# Patient Record
Sex: FEMALE | Race: WHITE | NOT HISPANIC OR LATINO | Employment: FULL TIME | URBAN - METROPOLITAN AREA
[De-identification: names, ages, dates, MRNs, and addresses within clinical notes are randomized per-mention and may not be internally consistent; named-entity substitution may affect disease eponyms.]

---

## 2017-01-04 ENCOUNTER — HOSPITAL ENCOUNTER (OUTPATIENT)
Dept: MAMMOGRAPHY | Facility: CLINIC | Age: 67
Discharge: HOME | End: 2017-01-04
Attending: INTERNAL MEDICINE | Admitting: INTERNAL MEDICINE
Payer: COMMERCIAL

## 2017-01-04 DIAGNOSIS — Z12.31 VISIT FOR SCREENING MAMMOGRAM: ICD-10-CM

## 2017-01-04 PROCEDURE — G0202 SCR MAMMO BI INCL CAD: HCPCS

## 2018-01-08 ENCOUNTER — HOSPITAL ENCOUNTER (OUTPATIENT)
Dept: MAMMOGRAPHY | Facility: CLINIC | Age: 68
Discharge: HOME | End: 2018-01-08
Attending: INTERNAL MEDICINE | Admitting: INTERNAL MEDICINE
Payer: COMMERCIAL

## 2018-01-08 DIAGNOSIS — Z12.31 VISIT FOR SCREENING MAMMOGRAM: ICD-10-CM

## 2018-01-08 PROCEDURE — 77067 SCR MAMMO BI INCL CAD: CPT

## 2019-01-18 ENCOUNTER — HOSPITAL ENCOUNTER (OUTPATIENT)
Dept: MAMMOGRAPHY | Facility: CLINIC | Age: 69
Discharge: HOME | End: 2019-01-18
Attending: INTERNAL MEDICINE | Admitting: INTERNAL MEDICINE
Payer: COMMERCIAL

## 2019-01-18 DIAGNOSIS — Z12.31 VISIT FOR SCREENING MAMMOGRAM: ICD-10-CM

## 2019-01-18 PROCEDURE — 77067 SCR MAMMO BI INCL CAD: CPT

## 2019-06-11 ENCOUNTER — APPOINTMENT (RX ONLY)
Dept: URBAN - NONMETROPOLITAN AREA CLINIC 31 | Facility: CLINIC | Age: 69
Setting detail: DERMATOLOGY
End: 2019-06-11

## 2020-01-20 ENCOUNTER — HOSPITAL ENCOUNTER (OUTPATIENT)
Dept: MAMMOGRAPHY | Facility: CLINIC | Age: 70
Discharge: HOME | End: 2020-01-20
Attending: INTERNAL MEDICINE | Admitting: INTERNAL MEDICINE
Payer: COMMERCIAL

## 2020-01-20 DIAGNOSIS — Z12.31 VISIT FOR SCREENING MAMMOGRAM: ICD-10-CM

## 2020-01-20 PROCEDURE — 77067 SCR MAMMO BI INCL CAD: CPT

## 2021-10-28 ENCOUNTER — OFFICE VISIT (OUTPATIENT)
Dept: OBGYN | Facility: CLINIC | Age: 71
End: 2021-10-28
Payer: COMMERCIAL

## 2021-10-28 ENCOUNTER — TELEPHONE (OUTPATIENT)
Dept: UROLOGY | Facility: CLINIC | Age: 71
End: 2021-10-28

## 2021-10-28 VITALS
DIASTOLIC BLOOD PRESSURE: 70 MMHG | WEIGHT: 193.3 LBS | BODY MASS INDEX: 35.57 KG/M2 | HEIGHT: 62 IN | SYSTOLIC BLOOD PRESSURE: 126 MMHG

## 2021-10-28 DIAGNOSIS — R31.0 GROSS HEMATURIA: Primary | ICD-10-CM

## 2021-10-28 DIAGNOSIS — N90.89 VULVAR LESION: ICD-10-CM

## 2021-10-28 DIAGNOSIS — Z12.4 ENCOUNTER FOR PAPANICOLAOU SMEAR FOR CERVICAL CANCER SCREENING: ICD-10-CM

## 2021-10-28 DIAGNOSIS — N95.2 VAGINAL ATROPHY: ICD-10-CM

## 2021-10-28 PROCEDURE — 87624 HPV HI-RISK TYP POOLED RSLT: CPT | Performed by: FAMILY MEDICINE

## 2021-10-28 PROCEDURE — 88305 TISSUE EXAM BY PATHOLOGIST: CPT | Performed by: PATHOLOGY

## 2021-10-28 PROCEDURE — 99203 OFFICE O/P NEW LOW 30 MIN: CPT | Performed by: FAMILY MEDICINE

## 2021-10-28 PROCEDURE — G0145 SCR C/V CYTO,THINLAYER,RESCR: HCPCS | Performed by: FAMILY MEDICINE

## 2021-10-28 PROCEDURE — 88312 SPECIAL STAINS GROUP 1: CPT | Performed by: PATHOLOGY

## 2021-10-28 RX ORDER — LOSARTAN POTASSIUM 50 MG/1
TABLET ORAL
COMMUNITY
Start: 2021-09-30

## 2021-10-28 RX ORDER — BETAMETHASONE DIPROPIONATE 0.5 MG/G
CREAM TOPICAL 2 TIMES DAILY
Qty: 70 G | Refills: 11 | Status: SHIPPED | OUTPATIENT
Start: 2021-10-28 | End: 2021-11-11

## 2021-10-28 RX ORDER — METOPROLOL SUCCINATE 50 MG/1
TABLET, EXTENDED RELEASE ORAL
COMMUNITY
Start: 2021-10-16

## 2021-10-28 RX ORDER — VITAMIN B COMPLEX/FOLIC ACID 0.4 MG
1 TABLET ORAL
COMMUNITY
Start: 2020-03-03

## 2021-10-28 RX ORDER — ESTRADIOL 10 UG/1
10 INSERT VAGINAL
Qty: 24 TABLET | Refills: 3 | Status: SHIPPED | OUTPATIENT
Start: 2021-10-28 | End: 2022-02-08

## 2021-10-28 RX ORDER — FAMOTIDINE 20 MG/1
TABLET, FILM COATED ORAL
COMMUNITY
Start: 2020-03-03 | End: 2024-04-24

## 2021-10-28 RX ORDER — ROPINIROLE 2 MG/1
TABLET, FILM COATED ORAL
COMMUNITY
Start: 2021-10-19 | End: 2024-04-24

## 2021-10-28 RX ORDER — ASPIRIN 81 MG/1
81 TABLET, CHEWABLE ORAL DAILY
COMMUNITY

## 2021-10-28 ASSESSMENT — MIFFLIN-ST. JEOR: SCORE: 1355.05

## 2021-10-28 NOTE — PATIENT INSTRUCTIONS
Return in 1-2 weeks for suture removal     Dr. Anu Tinajero,     Obstetrics and Gynecology  [facility] - [place] and [place]

## 2021-10-28 NOTE — PROGRESS NOTES
SUBJECTIVE:  Cammy Mahmood is an 71 year old No obstetric history on file.  woman who presents for   gynecology consult for vulvar irritation and bleeding from dryness, occurring with intercourse, tender, itching.  In the past used estrogen vaginally in her 40s, and then got better.   No LMP recorded. Patient is postmenopausal.   Menopause occurred mid 40s. No pap smears in our system.  Bladder filled with blood a week ago, saw urgent care,   Was treated as UTI.       History of abnormal Pap smear: Yes: but recently normal,   Now off pap screening.   Family history of uterine or ovarian cancer: No  History of abnormal mammogram: No  Family history of breast cancer: Yes:  age 50.        History reviewed. No pertinent past medical history.       History reviewed. No pertinent family history.    History reviewed. No pertinent surgical history.    Current Outpatient Medications   Medication     aspirin (ASA) 81 MG chewable tablet     doxylamine (UNISOM) 25 MG TABS tablet     famotidine (PEPCID) 20 MG tablet     losartan (COZAAR) 50 MG tablet     metoprolol succinate ER (TOPROL-XL) 50 MG 24 hr tablet     Multiple Vitamin (MULTIVITAMIN OR)     omeprazole (PRILOSEC) 20 MG DR capsule     rOPINIRole (REQUIP) 2 MG tablet     vitamin D3 (CHOLECALCIFEROL) 125 MCG (5000 UT) tablet     Vitamins-Lipotropics (B COMPLEX FORMULA 1, LIPOTROP,) TABS     No current facility-administered medications for this visit.     Allergies   Allergen Reactions     Other [No Clinical Screening - See Comments]      mirapex for restless legs       Social History     Tobacco Use     Smoking status: Never Smoker     Smokeless tobacco: Never Used   Substance Use Topics     Alcohol use: Not on file       Review Of Systems  Ears/Nose/Throat: negative  Respiratory: No shortness of breath, dyspnea on exertion, cough, or hemoptysis  Cardiovascular: negative  Gastrointestinal: negative  Genitourinary: See HPI   Constitutional, HEENT, cardiovascular,  "pulmonary, GI, , musculoskeletal, neuro, skin, endocrine and psych systems are negative, except as otherwise noted.    OBJECTIVE:  /70   Ht 1.575 m (5' 2\")   Wt 87.7 kg (193 lb 4.8 oz)   BMI 35.36 kg/m    General appearance: healthy, alert and no distress  Skin: Skin color, texture, turgor normal. No rashes or lesions.  Ears: negative  Nose/Sinuses: Nares normal. Septum midline. Mucosa normal. No drainage or sinus tenderness.  Oropharynx: Lips, mucosa, and tongue normal. Teeth and gums normal.  Neck: Neck supple. No adenopathy. Thyroid symmetric, normal size,, Carotids without bruits.  Lungs: negative, Percussion normal. Good diaphragmatic excursion. Lungs clear  Heart: negative, PMI normal. No lifts, heaves, or thrills. RRR. No murmurs, clicks gallops or rub  Breasts: deferred  Abdomen: Abdomen soft, non-tender. BS normal. No masses, organomegaly  Pelvic: Pelvic:  Pelvic examination with  pap/no Gonorrhea and Chlamydia   including  External genitalia normal   and vagina normal rugatted atrophic  Examination of urethra  normal no masses, tenderness, scarring  bladder, no masses or tenderness  Cervix no lesions or discharge  Bimanual exam with   Uterus 6-7 weeks size, mid position, mobile,no-tenderness, no descent   Adnexa/parametria  Normal no masses    Informed consent for vulvar biopsy  obtained  Procedure:  Vulva was cleansed with betadine x 3.  7 o'clock vulva lesion injected with 2% lidocaine.  4 mm punch biopsy performed at 7 o'clock position.  The incisions were closed with 2 figure-of-8 stitches using 4-0 monocryl suture.  Hemostasis assured. The patient tolerated the procedure well.  No intercourse for 2-6 weeks.      ASSESSMENT:  Cammy Mahmood is an 71 year old No obstetric history on file.  woman who presents for   gynecology consult for vulvar irritation and bleeding from dryness, occurring with intercourse, tender, itching.  In the past used estrogen vaginally in her 40s, and then got better. "   No LMP recorded. Patient is postmenopausal.   Menopause occurred mid 40s. No pap smears in our system.  Bladder filled with blood a week ago, saw urgent care,   Was treated as UTI.     PLAN:  Dx:  1)  Vaginal itching, pain and bleeding with intercourse:   Restart vaginal estrogen, vagifem   Vulvar biopsy  Return in 1-2 weeks for suture removal   2)  Gross hematuria one week ago, lasted 3 hours, was treated as UTI, but actually symptoms started @ 0900 and were gone by 1300,   Started antibiotic @ 1130 am.  Urology referral.    3) yearly breast and pelvic exams reviewed.     Labs were reviewed in [facility]   Imaging was reviewed in Epic   Tests and documents were reviewed.   Discussion of management or test interpretation       Dr. Anu Tinajero, DO    Obstetrics and Gynecology  [facility] - [place] and [place]

## 2021-10-28 NOTE — TELEPHONE ENCOUNTER
[facility] Call Center    Phone Message    May a detailed message be left on voicemail: yes     Reason for Call: Appointment Intake    Referring Provider Name: Anu Tinajero DO    Diagnosis and/or Symptoms: Gross Hematuria    Action Taken: Message routed to:  Other: ub uro    Travel Screening: Not Applicable

## 2021-10-28 NOTE — NURSING NOTE
"Chief Complaint   Patient presents with     Vaginal Problem     vaginal dryness tearing--irritation--tried some lotions       Initial /70   Ht 1.575 m (5' 2\")   Wt 87.7 kg (193 lb 4.8 oz)   BMI 35.36 kg/m   Estimated body mass index is 35.36 kg/m  as calculated from the following:    Height as of this encounter: 1.575 m (5' 2\").    Weight as of this encounter: 87.7 kg (193 lb 4.8 oz).  BP completed using cuff size: regular long    Questioned patient about current smoking habits.  Pt. has never smoked.      No obstetric history on file.    The following HM Due: NONE         "

## 2021-11-01 LAB
BKR LAB AP GYN ADEQUACY: NORMAL
BKR LAB AP GYN INTERPRETATION: NORMAL
BKR LAB AP HPV REFLEX: NORMAL
BKR LAB AP PREVIOUS ABNORMAL: NORMAL
PATH REPORT.COMMENTS IMP SPEC: NORMAL
PATH REPORT.FINAL DX SPEC: NORMAL
PATH REPORT.GROSS SPEC: NORMAL
PATH REPORT.MICROSCOPIC SPEC OTHER STN: NORMAL
PATH REPORT.RELEVANT HX SPEC: NORMAL
PATH REPORT.RELEVANT HX SPEC: NORMAL
PHOTO IMAGE: NORMAL

## 2021-11-03 LAB
HUMAN PAPILLOMA VIRUS 16 DNA: NEGATIVE
HUMAN PAPILLOMA VIRUS 18 DNA: NEGATIVE
HUMAN PAPILLOMA VIRUS FINAL DIAGNOSIS: NORMAL
HUMAN PAPILLOMA VIRUS OTHER HR: NEGATIVE

## 2021-11-05 PROBLEM — Z12.4 CERVICAL CANCER SCREENING: Status: ACTIVE | Noted: 2021-11-05

## 2021-11-11 ENCOUNTER — OFFICE VISIT (OUTPATIENT)
Dept: OBGYN | Facility: CLINIC | Age: 71
End: 2021-11-11
Payer: COMMERCIAL

## 2021-11-11 VITALS — DIASTOLIC BLOOD PRESSURE: 88 MMHG | WEIGHT: 195 LBS | BODY MASS INDEX: 35.67 KG/M2 | SYSTOLIC BLOOD PRESSURE: 138 MMHG

## 2021-11-11 DIAGNOSIS — L90.0 LICHEN SCLEROSUS: Primary | ICD-10-CM

## 2021-11-11 DIAGNOSIS — N90.89 VULVAR LESION: ICD-10-CM

## 2021-11-11 PROCEDURE — 99213 OFFICE O/P EST LOW 20 MIN: CPT | Performed by: FAMILY MEDICINE

## 2021-11-11 RX ORDER — BETAMETHASONE DIPROPIONATE 0.5 MG/G
CREAM TOPICAL 2 TIMES DAILY
Qty: 70 G | Refills: 11 | Status: SHIPPED | OUTPATIENT
Start: 2021-11-11 | End: 2022-02-08

## 2021-11-11 NOTE — PROGRESS NOTES
Subjective: 71 year old female status post vulvar biospy on 10/28/2021, here for incision check.  Doing well, denies fever, significant pain.    Is not taking pain medications.   States some light vaginal bleeding.      Objective:  EXAM:  /88 (BP Location: Left arm)   Wt 88.5 kg (195 lb)   Breastfeeding No   BMI 35.67 kg/m    Constitutional: healthy, alert and no distress  GYN PELVIC: NEGATIVE, normal external genitalia with LS changes, stitch removal     A.  Vulva, 8:00, biopsy:  -Squamous mucosa with lichenoid inflammation of the dermis and superimposed intraepithelial acute inflammation.  -See comment    Assessment/Plan:  71 year old female status post vulvar biospy on 10/28/2021  V67.00C Surgery Follow-Up Examination  (primary encounter diagnosis)  Comment:    Plan: return in 12 weeks, continue clobetasol       Dr. Anu Tinajero, DO    Obstetrics and Gynecology  [facility] - [place] and [place]

## 2021-11-11 NOTE — PATIENT INSTRUCTIONS
Squamous mucosa with lichenoid inflammation of the dermis and superimposed intraepithelial acute inflammation.      Continue topical cream twice daily x 6 weeks, then daily x 6 weeks   Return to the office @ 12 week post biopsy         Dr. Anu Tinajero, DO    Obstetrics and Gynecology  [facility] - [place] and [place]

## 2021-11-12 ENCOUNTER — OFFICE VISIT (OUTPATIENT)
Dept: UROLOGY | Facility: CLINIC | Age: 71
End: 2021-11-12
Payer: COMMERCIAL

## 2021-11-12 VITALS
HEIGHT: 62 IN | BODY MASS INDEX: 35.88 KG/M2 | DIASTOLIC BLOOD PRESSURE: 70 MMHG | WEIGHT: 195 LBS | SYSTOLIC BLOOD PRESSURE: 130 MMHG

## 2021-11-12 DIAGNOSIS — R31.0 GROSS HEMATURIA: Primary | ICD-10-CM

## 2021-11-12 LAB
ALBUMIN UR-MCNC: NEGATIVE MG/DL
APPEARANCE UR: CLEAR
BILIRUB UR QL STRIP: NEGATIVE
COLOR UR AUTO: YELLOW
GLUCOSE UR STRIP-MCNC: NEGATIVE MG/DL
HGB UR QL STRIP: NEGATIVE
KETONES UR STRIP-MCNC: NEGATIVE MG/DL
LEUKOCYTE ESTERASE UR QL STRIP: ABNORMAL
NITRATE UR QL: NEGATIVE
PH UR STRIP: 5 [PH] (ref 5–7)
SP GR UR STRIP: >=1.03 (ref 1–1.03)
UROBILINOGEN UR STRIP-ACNC: 0.2 E.U./DL

## 2021-11-12 PROCEDURE — 99204 OFFICE O/P NEW MOD 45 MIN: CPT | Performed by: PHYSICIAN ASSISTANT

## 2021-11-12 PROCEDURE — 81003 URINALYSIS AUTO W/O SCOPE: CPT | Mod: QW | Performed by: PHYSICIAN ASSISTANT

## 2021-11-12 PROCEDURE — 88112 CYTOPATH CELL ENHANCE TECH: CPT | Performed by: PATHOLOGY

## 2021-11-12 ASSESSMENT — PAIN SCALES - GENERAL: PAINLEVEL: NO PAIN (0)

## 2021-11-12 ASSESSMENT — MIFFLIN-ST. JEOR: SCORE: 1362.76

## 2021-11-12 NOTE — PROGRESS NOTES
CC: Hematuria.    HPI: It is a pleasure to see Ms. Cammy Mahmood, a 71 year old female, asked to be seen in consultation from Dr. Tinajero for evaluation of gross hematuria.  Noted this on 10/21 and seen in the ER (bright red, painless). UA without signs of infection and grossly with blood. Treated as UTI, but no UC. Recently seen by Dr. Tinajero and had vulvar bx.     The patient denies any further gross hematuria.  Ms. Mahmood voids without difficulty.  She currently denies any dysuria, pyuria, hesitancy, intermittency, feelings of incomplete emptying, or any recent history of urinary tract infections or stones.    Takes ASA 81 mg.    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   Retired RN.     Hematuria Risk Factors:  Age >40: Yes     Smoking history: no  Occupational exposure to chemicals or dyes (ie, benzenes, aromatic amines): no  History of urologic disorder or disease: no  History of irritative voiding symptoms: no  History of urinary tract infection: no  Analgesic abuse: no  History of pelvic irradiation: no    No past medical history on file.  No past surgical history on file.  Current Outpatient Medications   Medication Sig Dispense Refill     aspirin (ASA) 81 MG chewable tablet Take 81 mg by mouth daily       augmented betamethasone dipropionate (DIPROLENE-AF) 0.05 % external cream Apply topically 2 times daily Please dispense 70 gm tube 70 g 11     doxylamine (UNISOM) 25 MG TABS tablet Take 25 mg by mouth At Bedtime       estradiol (VAGIFEM) 10 MCG TABS vaginal tablet Place 1 tablet (10 mcg) vaginally twice a week 24 tablet 3     famotidine (PEPCID) 20 MG tablet Late afternoon       losartan (COZAAR) 50 MG tablet        metoprolol succinate ER (TOPROL-XL) 50 MG 24 hr tablet        Multiple Vitamin (MULTIVITAMIN OR) Take  by mouth.       omeprazole (PRILOSEC) 20 MG DR capsule Take 1 capsule by mouth       rOPINIRole (REQUIP) 2 MG tablet        vitamin D3 (CHOLECALCIFEROL) 125 MCG (5000 UT) tablet Take 5,000 Units by mouth        Vitamins-Lipotropics (B COMPLEX FORMULA 1, LIPOTROP,) TABS Take 1 tablet by mouth       Allergies   Allergen Reactions     Other [No Clinical Screening - See Comments]      mirapex for restless legs     FAMILY HISTORY: There is no reported history of genitourinary carcinoma.  There is no history of urolithiasis.      Social History     Socioeconomic History     Marital status:      Spouse name: Not on file     Number of children: Not on file     Years of education: Not on file     Highest education level: Not on file   Occupational History     Not on file   Tobacco Use     Smoking status: Never Smoker     Smokeless tobacco: Never Used   Substance and Sexual Activity     Alcohol use: Not on file     Drug use: Not on file     Sexual activity: Not on file   Other Topics Concern     Not on file   Social History Narrative     Not on file     Social Determinants of Health     Financial Resource Strain: Not on file   Food Insecurity: Not on file   Transportation Needs: Not on file   Physical Activity: Not on file   Stress: Not on file   Social Connections: Not on file   Intimate Partner Violence: Not on file   Housing Stability: Not on file       ROS: A comprehensive 10 point ROS was obtained and negative except for that outlined above in the HPI.    PHYSICAL EXAM:   There were no vitals filed for this visit.  PSYCH: NAD  EYES: EOMI  MOUTH: MMM  NEURO: AAO x3    Office Visit on 10/28/2021   Component Date Value Ref Range Status     Interpretation 10/28/2021 Negative for Intraepithelial Lesion or Malignancy (NILM)    Final     Specimen Adequacy 10/28/2021 Satisfactory for evaluation, endocervical/transformation zone component present   Final     Clinical Information 10/28/2021    Final                    Value:This result contains rich text formatting which cannot be displayed here.     HPV Reflex 10/28/2021 Yes regardless of result   Final     Previous Abnormal? 10/28/2021    Final                    Value:This  result contains rich text formatting which cannot be displayed here.     Performing Labs 10/28/2021    Final                    Value:This result contains rich text formatting which cannot be displayed here.     Case Report 10/28/2021    Final                    Value:Surgical Pathology Report                         Case: [id number]                                  Authorizing Provider:  Anu Tinajero DO   Collected:           10/28/2021 09:01 AM          Ordering Location:     [facility]  Received:            10/28/2021 09:35 AM                                 [facility]                                                            Pathologist:           Berkley Bray                                                               Specimen:    Vulva, 8:00                                                                                 Final Diagnosis 10/28/2021    Final                    Value:This result contains rich text formatting which cannot be displayed here.     Comment 10/28/2021    Final                    Value:This result contains rich text formatting which cannot be displayed here.     Clinical Information 10/28/2021    Final                    Value:This result contains rich text formatting which cannot be displayed here.     Gross Description 10/28/2021    Final                    Value:This result contains rich text formatting which cannot be displayed here.     Microscopic Description 10/28/2021    Final                    Value:This result contains rich text formatting which cannot be displayed here.     Performing Labs 10/28/2021    Final                    Value:This result contains rich text formatting which cannot be displayed here.     Other HR HPV 10/28/2021 Negative  Negative Final     HPV16 DNA 10/28/2021 Negative  Negative Final     HPV18 DNA 10/28/2021 Negative  Negative Final     FINAL DIAGNOSIS 10/28/2021    Final                    Value:This result contains  rich text formatting which cannot be displayed here.       ASSESSMENT and PLAN:    71 year old female with gross hematuria.  The differential diagnosis at this point includes stone disease, infection, vaginal contaminant, urothelial malignancy, renal disorder versus another yet unknown diagnosis.    At this time, recommend proceeding with comprehensive hematuria evaluation to include:  - Urine cytology to look for cells concerning for malignancy.  - CT urogram for upper tract imaging.  - Cystoscopy with the first available urologist to evaluate the interior of the bladder. Follow up for hematuria as recommended by urologist performing cystoscopic evaluation.    Thank you for allowing me to participate in Ms. Mahmood's care. I will keep you updated of her progress, but please do not hesitate to contact me with any questions.    Carly Silver PA-C  [facility]  22 minutes spent on the date of the encounter doing chart review, review of outside records, review of test results, interpretation of tests, patient visit and documentation.

## 2021-11-12 NOTE — PATIENT INSTRUCTIONS
- Urine cytology to look for abnormal cells.  - CT scan:   - Cystoscopy with the  urologist to evaluate the interior of the bladder. Follow up as recommended by the urologist.

## 2021-11-12 NOTE — LETTER
11/12/2021       RE: Cammy Mahmood  [address]  [place] [address]     Dear Colleague,    Thank you for referring your patient, Cammy Mahmood, to the [facility] [place] [facility]. Please see a copy of my visit note below.    CC: Hematuria.    HPI: It is a pleasure to see Ms. Cammy Mahmood, a 71 year old female, asked to be seen in consultation from Dr. Tinajero for evaluation of gross hematuria.  Noted this on 10/21 and seen in the ER (bright red, painless). UA without signs of infection and grossly with blood. Treated as UTI, but no UC. Recently seen by Dr. Tinajero and had vulvar bx.     The patient denies any further gross hematuria.  Ms. Mahmood voids without difficulty.  She currently denies any dysuria, pyuria, hesitancy, intermittency, feelings of incomplete emptying, or any recent history of urinary tract infections or stones.    Takes ASA 81 mg.    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   Retired RN.     Hematuria Risk Factors:  Age >40: Yes     Smoking history: no  Occupational exposure to chemicals or dyes (ie, benzenes, aromatic amines): no  History of urologic disorder or disease: no  History of irritative voiding symptoms: no  History of urinary tract infection: no  Analgesic abuse: no  History of pelvic irradiation: no    No past medical history on file.  No past surgical history on file.  Current Outpatient Medications   Medication Sig Dispense Refill     aspirin (ASA) 81 MG chewable tablet Take 81 mg by mouth daily       augmented betamethasone dipropionate (DIPROLENE-AF) 0.05 % external cream Apply topically 2 times daily Please dispense 70 gm tube 70 g 11     doxylamine (UNISOM) 25 MG TABS tablet Take 25 mg by mouth At Bedtime       estradiol (VAGIFEM) 10 MCG TABS vaginal tablet Place 1 tablet (10 mcg) vaginally twice a week 24 tablet 3     famotidine (PEPCID) 20 MG tablet Late afternoon       losartan (COZAAR) 50 MG tablet         metoprolol succinate ER (TOPROL-XL) 50 MG 24 hr tablet        Multiple Vitamin (MULTIVITAMIN OR) Take  by mouth.       omeprazole (PRILOSEC) 20 MG DR capsule Take 1 capsule by mouth       rOPINIRole (REQUIP) 2 MG tablet        vitamin D3 (CHOLECALCIFEROL) 125 MCG (5000 UT) tablet Take 5,000 Units by mouth       Vitamins-Lipotropics (B COMPLEX FORMULA 1, LIPOTROP,) TABS Take 1 tablet by mouth       Allergies   Allergen Reactions     Other [No Clinical Screening - See Comments]      mirapex for restless legs     FAMILY HISTORY: There is no reported history of genitourinary carcinoma.  There is no history of urolithiasis.      Social History     Socioeconomic History     Marital status:      Spouse name: Not on file     Number of children: Not on file     Years of education: Not on file     Highest education level: Not on file   Occupational History     Not on file   Tobacco Use     Smoking status: Never Smoker     Smokeless tobacco: Never Used   Substance and Sexual Activity     Alcohol use: Not on file     Drug use: Not on file     Sexual activity: Not on file   Other Topics Concern     Not on file   Social History Narrative     Not on file     Social Determinants of Health     Financial Resource Strain: Not on file   Food Insecurity: Not on file   Transportation Needs: Not on file   Physical Activity: Not on file   Stress: Not on file   Social Connections: Not on file   Intimate Partner Violence: Not on file   Housing Stability: Not on file       ROS: A comprehensive 10 point ROS was obtained and negative except for that outlined above in the HPI.    PHYSICAL EXAM:   There were no vitals filed for this visit.  PSYCH: NAD  EYES: EOMI  MOUTH: MMM  NEURO: AAO x3    Office Visit on 10/28/2021   Component Date Value Ref Range Status     Interpretation 10/28/2021 Negative for Intraepithelial Lesion or Malignancy (NILM)    Final     Specimen Adequacy 10/28/2021 Satisfactory for evaluation,  endocervical/transformation zone component present   Final     Clinical Information 10/28/2021    Final                    Value:This result contains rich text formatting which cannot be displayed here.     HPV Reflex 10/28/2021 Yes regardless of result   Final     Previous Abnormal? 10/28/2021    Final                    Value:This result contains rich text formatting which cannot be displayed here.     Performing Labs 10/28/2021    Final                    Value:This result contains rich text formatting which cannot be displayed here.     Case Report 10/28/2021    Final                    Value:Surgical Pathology Report                         Case: [id number]                                  Authorizing Provider:  Anu Tinajero DO   Collected:           10/28/2021 09:01 AM          Ordering Location:     [facility]  Received:            10/28/2021 09:35 AM                                 [facility] [place]                                                            Pathologist:           Berkley Bray                                                               Specimen:    Vulva, 8:00                                                                                 Final Diagnosis 10/28/2021    Final                    Value:This result contains rich text formatting which cannot be displayed here.     Comment 10/28/2021    Final                    Value:This result contains rich text formatting which cannot be displayed here.     Clinical Information 10/28/2021    Final                    Value:This result contains rich text formatting which cannot be displayed here.     Gross Description 10/28/2021    Final                    Value:This result contains rich text formatting which cannot be displayed here.     Microscopic Description 10/28/2021    Final                    Value:This result contains rich text formatting which cannot be displayed here.     Performing Labs 10/28/2021    Final                     Value:This result contains rich text formatting which cannot be displayed here.     Other HR HPV 10/28/2021 Negative  Negative Final     HPV16 DNA 10/28/2021 Negative  Negative Final     HPV18 DNA 10/28/2021 Negative  Negative Final     FINAL DIAGNOSIS 10/28/2021    Final                    Value:This result contains rich text formatting which cannot be displayed here.       ASSESSMENT and PLAN:    71 year old female with gross hematuria.  The differential diagnosis at this point includes stone disease, infection, vaginal contaminant, urothelial malignancy, renal disorder versus another yet unknown diagnosis.    At this time, recommend proceeding with comprehensive hematuria evaluation to include:  - Urine cytology to look for cells concerning for malignancy.  - CT urogram for upper tract imaging.  - Cystoscopy with the first available urologist to evaluate the interior of the bladder. Follow up for hematuria as recommended by urologist performing cystoscopic evaluation.    Thank you for allowing me to participate in Ms. Mahmood's care. I will keep you updated of her progress, but please do not hesitate to contact me with any questions.    Carly Silver PA-C  [facility]  22 minutes spent on the date of the encounter doing chart review, review of outside records, review of test results, interpretation of tests, patient visit and documentation.

## 2021-11-15 ENCOUNTER — TELEPHONE (OUTPATIENT)
Dept: OBGYN | Facility: CLINIC | Age: 71
End: 2021-11-15
Payer: COMMERCIAL

## 2021-11-15 LAB
PATH REPORT.COMMENTS IMP SPEC: NORMAL
PATH REPORT.FINAL DX SPEC: NORMAL
PATH REPORT.GROSS SPEC: NORMAL
PATH REPORT.MICROSCOPIC SPEC OTHER STN: NORMAL
PATH REPORT.RELEVANT HX SPEC: NORMAL

## 2021-11-15 NOTE — TELEPHONE ENCOUNTER
PRIOR AUTHORIZATION DENIED    Medication: estradiol tier exception     Denial Date: 11/15/2021    Denial Rational: [facility] faxed denial regarding patient's request for tier exception on her generic estradiol 10mcg tablets. This was denied through her insurance.   Please advise medication is still covered but will remain on the current tier.          Appeal Information: This medication was denied. If physician would like to appeal because patient has contraindication or allergy to covered medication please write letter of medical necessity and route back to PA team to initiate.  If no further action is needed please close encounter thank you.

## 2021-11-30 ENCOUNTER — TELEPHONE (OUTPATIENT)
Dept: UROLOGY | Facility: CLINIC | Age: 71
End: 2021-11-30
Payer: COMMERCIAL

## 2021-11-30 NOTE — TELEPHONE ENCOUNTER
[facility] Call Center    Phone Message    May a detailed message be left on voicemail: yes     Reason for Call: Other: Cammy calling to reschedule her Cysto appt on 12/20.  She will need it to be scheduled in 2022. Please call Cammy to discuss.     Action Taken: Message routed to:  Other: urolo    Travel Screening: Not Applicable

## 2022-01-15 ENCOUNTER — LAB (OUTPATIENT)
Dept: URGENT CARE | Facility: URGENT CARE | Age: 72
End: 2022-01-15
Payer: COMMERCIAL

## 2022-01-15 DIAGNOSIS — Z20.822 SUSPECTED COVID-19 VIRUS INFECTION: ICD-10-CM

## 2022-01-15 LAB — SARS-COV-2 RNA RESP QL NAA+PROBE: NEGATIVE

## 2022-01-15 PROCEDURE — U0003 INFECTIOUS AGENT DETECTION BY NUCLEIC ACID (DNA OR RNA); SEVERE ACUTE RESPIRATORY SYNDROME CORONAVIRUS 2 (SARS-COV-2) (CORONAVIRUS DISEASE [COVID-19]), AMPLIFIED PROBE TECHNIQUE, MAKING USE OF HIGH THROUGHPUT TECHNOLOGIES AS DESCRIBED BY CMS-2020-01-R: HCPCS

## 2022-01-15 PROCEDURE — U0005 INFEC AGEN DETEC AMPLI PROBE: HCPCS

## 2022-02-08 ENCOUNTER — OFFICE VISIT (OUTPATIENT)
Dept: OBGYN | Facility: CLINIC | Age: 72
End: 2022-02-08
Payer: COMMERCIAL

## 2022-02-08 ENCOUNTER — HOSPITAL ENCOUNTER (OUTPATIENT)
Dept: MAMMOGRAPHY | Facility: CLINIC | Age: 72
Discharge: HOME | End: 2022-02-08
Attending: INTERNAL MEDICINE | Admitting: INTERNAL MEDICINE
Payer: COMMERCIAL

## 2022-02-08 VITALS
WEIGHT: 191.5 LBS | HEIGHT: 62 IN | DIASTOLIC BLOOD PRESSURE: 80 MMHG | BODY MASS INDEX: 35.24 KG/M2 | SYSTOLIC BLOOD PRESSURE: 130 MMHG

## 2022-02-08 DIAGNOSIS — Z12.31 VISIT FOR SCREENING MAMMOGRAM: ICD-10-CM

## 2022-02-08 DIAGNOSIS — N95.2 VAGINAL ATROPHY: ICD-10-CM

## 2022-02-08 DIAGNOSIS — N90.89 VULVAR LESION: ICD-10-CM

## 2022-02-08 DIAGNOSIS — L90.0 LICHEN SCLEROSUS: ICD-10-CM

## 2022-02-08 PROCEDURE — 56605 BIOPSY OF VULVA/PERINEUM: CPT | Performed by: FAMILY MEDICINE

## 2022-02-08 PROCEDURE — 77067 SCR MAMMO BI INCL CAD: CPT

## 2022-02-08 PROCEDURE — 88305 TISSUE EXAM BY PATHOLOGIST: CPT | Performed by: PATHOLOGY

## 2022-02-08 PROCEDURE — 99213 OFFICE O/P EST LOW 20 MIN: CPT | Mod: 25 | Performed by: FAMILY MEDICINE

## 2022-02-08 RX ORDER — BETAMETHASONE DIPROPIONATE 0.5 MG/G
CREAM TOPICAL 2 TIMES DAILY
Qty: 70 G | Refills: 11 | Status: SHIPPED | OUTPATIENT
Start: 2022-02-08 | End: 2023-05-04

## 2022-02-08 RX ORDER — ESTRADIOL 10 UG/1
10 INSERT VAGINAL
Qty: 24 TABLET | Refills: 3 | Status: SHIPPED | OUTPATIENT
Start: 2022-02-10 | End: 2022-08-15

## 2022-02-08 ASSESSMENT — MIFFLIN-ST. JEOR: SCORE: 1346.89

## 2022-02-08 NOTE — PATIENT INSTRUCTIONS
Return in May 16 2022 or later     Dr. Anu Tinajero, DO    Obstetrics and Gynecology  [facility] and [place]

## 2022-02-08 NOTE — NURSING NOTE
"Chief Complaint   Patient presents with     RECHECK     follow up Lichen Sclerosis--using betamethasone cream daily--still sore around clitoris and urethra       Initial /80   Ht 1.575 m (5' 2\")   Wt 86.9 kg (191 lb 8 oz)   BMI 35.03 kg/m   Estimated body mass index is 35.03 kg/m  as calculated from the following:    Height as of this encounter: 1.575 m (5' 2\").    Weight as of this encounter: 86.9 kg (191 lb 8 oz).  BP completed using cuff size: regular long    Questioned patient about current smoking habits.  Pt. has never smoked.      No obstetric history on file.    The following HM Due: NONE      "

## 2022-02-08 NOTE — PROGRESS NOTES
SUBJECTIVE:  Cammy Mahmood is an 71 year old  woman who presents for   Gyn follow-up exam. She was previously seen for vaginal atrophy and lichen sclerosis   On vagifem and diprolene.    History reviewed. No pertinent past medical history.       History reviewed. No pertinent family history.    History reviewed. No pertinent surgical history.    Current Outpatient Medications   Medication     aspirin (ASA) 81 MG chewable tablet     augmented betamethasone dipropionate (DIPROLENE-AF) 0.05 % external cream     coenzyme Q-10 10 MG CAPS     doxylamine (UNISOM) 25 MG TABS tablet     estradiol (VAGIFEM) 10 MCG TABS vaginal tablet     famotidine (PEPCID) 20 MG tablet     losartan (COZAAR) 50 MG tablet     metoprolol succinate ER (TOPROL-XL) 50 MG 24 hr tablet     Multiple Vitamin (MULTIVITAMIN OR)     omeprazole (PRILOSEC) 20 MG DR capsule     rOPINIRole (REQUIP) 2 MG tablet     vitamin D3 (CHOLECALCIFEROL) 125 MCG (5000 UT) tablet     Vitamins-Lipotropics (B COMPLEX FORMULA 1, LIPOTROP,) TABS     No current facility-administered medications for this visit.     Allergies   Allergen Reactions     Other [No Clinical Screening - See Comments]      mirapex for restless legs     Dust Mites Other (See Comments)     Cat dander, dog dander     Other Environmental Allergy Other (See Comments)     Other reaction(s): Runny Nose  Cat dander, dog dander  Mirapex - Bloody mucous drainage in urine       Social History     Tobacco Use     Smoking status: Never Smoker     Smokeless tobacco: Never Used   Substance Use Topics     Alcohol use: Not on file       Review Of Systems  Ears/Nose/Throat: negative  Respiratory: No shortness of breath, dyspnea on exertion, cough, or hemoptysis  Cardiovascular: negative  Gastrointestinal: negative  Genitourinary: See HPI   Constitutional, HEENT, cardiovascular, pulmonary, GI, , musculoskeletal, neuro, skin, endocrine and psych systems are negative, except as otherwise noted.      OBJECTIVE:  BP  "130/80   Ht 1.575 m (5' 2\")   Wt 86.9 kg (191 lb 8 oz)   BMI 35.03 kg/m    General appearance: healthy, alert and no distress  Skin: Skin color, texture, turgor normal. No rashes or lesions.  Ears: negative  Nose/Sinuses: Nares normal. Septum midline. Mucosa normal. No drainage or sinus tenderness.  Oropharynx: Lips, mucosa, and tongue normal. Teeth and gums normal.  Neck: Neck supple. No adenopathy. Thyroid symmetric, normal size,, Carotids without bruits.  Lungs: negative, Percussion normal. Good diaphragmatic excursion. Lungs clear  Heart: negative, PMI normal. No lifts, heaves, or thrills. RRR. No murmurs, clicks gallops or rub  Breasts: Inspection negative. No nipple discharge or bleeding. No masses.  Abdomen: Abdomen soft, non-tender. BS normal. No masses, organomegaly  Pelvic: Pelvic examination with no pap/noGonorrhea and Chlamydia  External genitalia with Lichen sclerosis changes,   Bimanual exam deferred today, last exam 1028/2021    Informed consent for vulvar biopsy obtained  Procedure:  Vulva was cleansed with betadine x 3.  2 o'clock vulva lesion injected with 2% lidocaine without epi.  4 mm punch biopsy performed at 2 o'clock position.  The incisions were closed with one figure-of-8  stitch using 4-0 monocryl suture.  Hemostasis assured. The patient tolerated the procedure well.  No intercourse for 2-6 weeks.        ASSESSMENT:  Cammy Mahmood is an 71 year old  woman who presents for   Gyn follow-up exam. She was previously seen for vaginal atrophy and lichen sclerosis   On vagifem and clobetasol.    PLAN:  Dx:   1)  Vaginal atrophy:  On vagifem working well  2)  Lichen sclerosis:  On diprolene daily   3) Breast exam today.     Labs were reviewed in [facility]   Imaging was reviewed in Epic   Tests and documents were reviewed.   Discussion of management or test interpretation          Dr. Anu Tinajero, DO    OB/GYN   [facility]    "

## 2022-02-10 LAB
PATH REPORT.COMMENTS IMP SPEC: NORMAL
PATH REPORT.COMMENTS IMP SPEC: NORMAL
PATH REPORT.FINAL DX SPEC: NORMAL
PATH REPORT.GROSS SPEC: NORMAL
PATH REPORT.MICROSCOPIC SPEC OTHER STN: NORMAL
PATH REPORT.RELEVANT HX SPEC: NORMAL
PHOTO IMAGE: NORMAL

## 2022-02-12 ENCOUNTER — HEALTH MAINTENANCE LETTER (OUTPATIENT)
Age: 72
End: 2022-02-12

## 2022-05-25 ENCOUNTER — OFFICE VISIT (OUTPATIENT)
Dept: OBGYN | Facility: CLINIC | Age: 72
End: 2022-05-25
Payer: COMMERCIAL

## 2022-05-25 VITALS
WEIGHT: 187.7 LBS | DIASTOLIC BLOOD PRESSURE: 70 MMHG | HEIGHT: 62 IN | BODY MASS INDEX: 34.54 KG/M2 | SYSTOLIC BLOOD PRESSURE: 128 MMHG

## 2022-05-25 DIAGNOSIS — N95.2 VAGINAL ATROPHY: Primary | ICD-10-CM

## 2022-05-25 DIAGNOSIS — L90.0 LICHEN SCLEROSUS: ICD-10-CM

## 2022-05-25 PROCEDURE — 99212 OFFICE O/P EST SF 10 MIN: CPT | Performed by: FAMILY MEDICINE

## 2022-05-25 NOTE — NURSING NOTE
"Chief Complaint   Patient presents with     RECHECK       Initial /70   Ht 1.575 m (5' 2\")   Wt 85.1 kg (187 lb 11.2 oz)   BMI 34.33 kg/m   Estimated body mass index is 34.33 kg/m  as calculated from the following:    Height as of this encounter: 1.575 m (5' 2\").    Weight as of this encounter: 85.1 kg (187 lb 11.2 oz).  BP completed using cuff size: regular    Questioned patient about current smoking habits.  Pt. has never smoked.      No obstetric history on file.    The following HM Due: NONE      "

## 2022-05-25 NOTE — PROGRESS NOTES
"SUBJECTIVE:  Cammy Mahmood is an 71 year old woman who presents for   Gyn follow-up exam. She was previously seen for vaginal atrophy, lichen sclerosis.    History reviewed. No pertinent past medical history.       History reviewed. No pertinent family history.    History reviewed. No pertinent surgical history.    Current Outpatient Medications   Medication     aspirin (ASA) 81 MG chewable tablet     augmented betamethasone dipropionate (DIPROLENE-AF) 0.05 % external cream     coenzyme Q-10 10 MG CAPS     doxylamine (UNISOM) 25 MG TABS tablet     estradiol (VAGIFEM) 10 MCG TABS vaginal tablet     famotidine (PEPCID) 20 MG tablet     losartan (COZAAR) 50 MG tablet     metoprolol succinate ER (TOPROL-XL) 50 MG 24 hr tablet     Multiple Vitamin (MULTIVITAMIN OR)     omeprazole (PRILOSEC) 20 MG DR capsule     rOPINIRole (REQUIP) 2 MG tablet     vitamin D3 (CHOLECALCIFEROL) 125 MCG (5000 UT) tablet     Vitamins-Lipotropics (B COMPLEX FORMULA 1, LIPOTROP,) TABS     No current facility-administered medications for this visit.     Allergies   Allergen Reactions     Other [No Clinical Screening - See Comments]      mirapex for restless legs     Dust Mites Other (See Comments)     Cat dander, dog dander     Other Environmental Allergy Other (See Comments)     Other reaction(s): Runny Nose  Cat dander, dog dander  Mirapex - Bloody mucous drainage in urine       Social History     Tobacco Use     Smoking status: Never Smoker     Smokeless tobacco: Never Used   Substance Use Topics     Alcohol use: Not on file       Review Of Systems  Ears/Nose/Throat: negative  Respiratory: No shortness of breath, dyspnea on exertion, cough, or hemoptysis  Cardiovascular: negative  Gastrointestinal: negative  Genitourinary: See HPI   Constitutional, HEENT, cardiovascular, pulmonary, GI, , musculoskeletal, neuro, skin, endocrine and psych systems are negative, except as otherwise noted.      OBJECTIVE:  /70   Ht 1.575 m (5' 2\")   Wt " 85.1 kg (187 lb 11.2 oz)   BMI 34.33 kg/m    General appearance: healthy, alert and no distress  Skin: Skin color, texture, turgor normal. No rashes or lesions.  Ears: negative  Nose/Sinuses: Nares normal. Septum midline. Mucosa normal. No drainage or sinus tenderness.  Oropharynx: Lips, mucosa, and tongue normal. Teeth and gums normal.  Neck: Neck supple. No adenopathy. Thyroid symmetric, normal size,, Carotids without bruits.  Pelvic: Pelvic examination with no pap/no Gonorrhea and Chlamydia  External genitalia LS changes, white areas, erythematic areas, no changes.       ASSESSMENT:  Cammy Mahmood is an 71 year old woman who presents for   Gyn follow-up exam. She was previously seen for vaginal atrophy.    PLAN:    Dx: vaginal atrophy, lichen sclerosis   1)  Vaginal atrophy:  vagifem  2)  Lichen sclerosis:  Taking clobetasol 3 x weekly   3)  Return in 6 months for recheck, October, then yearly     Labs were reviewed in [facility]   Imaging was reviewed in Epic   Tests and documents were reviewed.   Discussion of management or test interpretation       Dr. Anu Tinajero, DO    OB/GYN   [facility]

## 2022-05-25 NOTE — PATIENT INSTRUCTIONS
Return in 6 months for recheck, October, then yearly       Dr. Anu Tinajero,     Obstetrics and Gynecology  [facility] - [place] and [place]

## 2022-10-10 ENCOUNTER — HEALTH MAINTENANCE LETTER (OUTPATIENT)
Age: 72
End: 2022-10-10

## 2023-02-10 ENCOUNTER — HOSPITAL ENCOUNTER (OUTPATIENT)
Dept: MAMMOGRAPHY | Facility: CLINIC | Age: 73
Discharge: HOME | End: 2023-02-10
Attending: INTERNAL MEDICINE | Admitting: INTERNAL MEDICINE
Payer: COMMERCIAL

## 2023-02-10 DIAGNOSIS — Z12.31 VISIT FOR SCREENING MAMMOGRAM: ICD-10-CM

## 2023-02-10 PROCEDURE — 77067 SCR MAMMO BI INCL CAD: CPT

## 2023-03-27 ENCOUNTER — APPOINTMENT (RX ONLY)
Dept: URBAN - NONMETROPOLITAN AREA CLINIC 27 | Facility: CLINIC | Age: 73
Setting detail: DERMATOLOGY
End: 2023-03-27

## 2023-03-27 DIAGNOSIS — L20.89 OTHER ATOPIC DERMATITIS: ICD-10-CM

## 2023-03-27 PROBLEM — L20.84 INTRINSIC (ALLERGIC) ECZEMA: Status: ACTIVE | Noted: 2023-03-27

## 2023-03-27 PROCEDURE — ? PATIENT SPECIFIC COUNSELING

## 2023-03-27 PROCEDURE — 99212 OFFICE O/P EST SF 10 MIN: CPT

## 2023-03-27 PROCEDURE — ? COUNSELING

## 2023-03-27 ASSESSMENT — LOCATION DETAILED DESCRIPTION DERM
LOCATION DETAILED: RIGHT DISTAL POSTERIOR UPPER ARM
LOCATION DETAILED: LEFT ANTERIOR PROXIMAL THIGH
LOCATION DETAILED: RIGHT PROXIMAL PRETIBIAL REGION
LOCATION DETAILED: LEFT POSTERIOR SHOULDER

## 2023-03-27 ASSESSMENT — LOCATION ZONE DERM
LOCATION ZONE: ARM
LOCATION ZONE: LEG

## 2023-03-27 ASSESSMENT — LOCATION SIMPLE DESCRIPTION DERM
LOCATION SIMPLE: RIGHT PRETIBIAL REGION
LOCATION SIMPLE: LEFT SHOULDER
LOCATION SIMPLE: RIGHT POSTERIOR UPPER ARM
LOCATION SIMPLE: LEFT THIGH

## 2023-03-27 NOTE — PROCEDURE: PATIENT SPECIFIC COUNSELING
Detail Level: Zone
Pc prescribed patient topical cream. Patient will call us to let us know what topicals were prescribed.

## 2023-05-04 ENCOUNTER — OFFICE VISIT (OUTPATIENT)
Dept: OBGYN | Facility: CLINIC | Age: 73
End: 2023-05-04
Payer: COMMERCIAL

## 2023-05-04 VITALS
DIASTOLIC BLOOD PRESSURE: 70 MMHG | WEIGHT: 184.2 LBS | HEIGHT: 62 IN | BODY MASS INDEX: 33.9 KG/M2 | SYSTOLIC BLOOD PRESSURE: 118 MMHG

## 2023-05-04 DIAGNOSIS — N95.2 VAGINAL ATROPHY: ICD-10-CM

## 2023-05-04 DIAGNOSIS — N90.89 VULVAR LESION: ICD-10-CM

## 2023-05-04 DIAGNOSIS — L90.0 LICHEN SCLEROSUS: ICD-10-CM

## 2023-05-04 PROCEDURE — 99397 PER PM REEVAL EST PAT 65+ YR: CPT | Performed by: FAMILY MEDICINE

## 2023-05-04 RX ORDER — ESTRADIOL 10 UG/1
10 INSERT VAGINAL
Qty: 24 TABLET | Refills: 3 | Status: SHIPPED | OUTPATIENT
Start: 2023-05-04 | End: 2024-03-19

## 2023-05-04 RX ORDER — BETAMETHASONE DIPROPIONATE 0.5 MG/G
CREAM TOPICAL 2 TIMES DAILY
Qty: 70 G | Refills: 11 | Status: SHIPPED | OUTPATIENT
Start: 2023-05-04 | End: 2024-06-18

## 2023-05-04 NOTE — PATIENT INSTRUCTIONS
Return yearly     Mammogram yearly       Dr. Anu Tinajero, DO    Obstetrics and Gynecology  [facility] - [place] and [place]

## 2023-05-04 NOTE — NURSING NOTE
"Chief Complaint   Patient presents with     Gyn Exam     Needs refill on estradiol       Initial /70   Ht 1.575 m (5' 2\")   Wt 83.6 kg (184 lb 3.2 oz)   BMI 33.69 kg/m   Estimated body mass index is 33.69 kg/m  as calculated from the following:    Height as of this encounter: 1.575 m (5' 2\").    Weight as of this encounter: 83.6 kg (184 lb 3.2 oz).  BP completed using cuff size: regular    Questioned patient about current smoking habits.  Pt. has never smoked.      No obstetric history on file.    The following HM Due: NONE         "

## 2023-05-04 NOTE — PROGRESS NOTES
SUBJECTIVE:  Cammy Mahmood is an 72 year old postmenopausal woman   who presents for annual gyn exam. Menopause at age 60s. No   bleeding, spotting, or discharge noted. Concerns:  None     Estrogen replacement therapy: vag fem   CHEO exposure: no  History of abnormal Pap smear: Yes: but recently normal,   Now off pap screening.   Family history of uterine or ovarian cancer: No  History of abnormal mammogram: No  Family history of breast cancer: Yes:  age 50.    History reviewed. No pertinent past medical history.       History reviewed. No pertinent family history.    History reviewed. No pertinent surgical history.    Current Outpatient Medications   Medication     aspirin (ASA) 81 MG chewable tablet     augmented betamethasone dipropionate (DIPROLENE-AF) 0.05 % external cream     coenzyme Q-10 10 MG CAPS     doxylamine (UNISOM) 25 MG TABS tablet     estradiol (VAGIFEM) 10 MCG TABS vaginal tablet     famotidine (PEPCID) 20 MG tablet     losartan (COZAAR) 50 MG tablet     metoprolol succinate ER (TOPROL-XL) 50 MG 24 hr tablet     Multiple Vitamin (MULTIVITAMIN OR)     omeprazole (PRILOSEC) 20 MG DR capsule     rOPINIRole (REQUIP) 2 MG tablet     vitamin D3 (CHOLECALCIFEROL) 125 MCG (5000 UT) tablet     Vitamins-Lipotropics (B COMPLEX FORMULA 1, LIPOTROP,) TABS     No current facility-administered medications for this visit.     Allergies   Allergen Reactions     Other [No Clinical Screening - See Comments]      mirapex for restless legs     Dust Mites Other (See Comments)     Cat dander, dog dander     Other Environmental Allergy Other (See Comments)     Other reaction(s): Runny Nose  Cat dander, dog dander  Mirapex - Bloody mucous drainage in urine       Social History     Tobacco Use     Smoking status: Never     Smokeless tobacco: Never   Vaping Use     Vaping status: Not on file   Substance Use Topics     Alcohol use: Not on file       Review Of Systems  Ears/Nose/Throat: negative  Respiratory: No shortness  "of breath, dyspnea on exertion, cough, or hemoptysis  Cardiovascular: negative  Gastrointestinal: negative  Genitourinary: See HPI   Constitutional, HEENT, cardiovascular, pulmonary, GI, , musculoskeletal, neuro, skin, endocrine and psych systems are negative, except as otherwise noted.    OBJECTIVE:  /70   Ht 1.575 m (5' 2\")   Wt 83.6 kg (184 lb 3.2 oz)   BMI 33.69 kg/m    General appearance: healthy, alert and no distress  Skin: Skin color, texture, turgor normal. No rashes or lesions.  Ears: negative  Nose/Sinuses: Nares normal. Septum midline. Mucosa normal. No drainage or sinus tenderness.  Oropharynx: Lips, mucosa, and tongue normal. Teeth and gums normal.  Neck: Neck supple. No adenopathy. Thyroid symmetric, normal size,, Carotids without bruits.  Lungs: negative, Percussion normal. Good diaphragmatic excursion. Lungs clear  Heart: negative, PMI normal. No lifts, heaves, or thrills. RRR. No murmurs, clicks gallops or rub  Breasts: Inspection negative. No nipple discharge or bleeding. No masses.  Abdomen: Abdomen soft, non-tender. BS normal. No masses, organomegaly  Pelvic: Pelvic:  Pelvic examination with no pap/no Gonorrhea and Chlamydia   including  External genitalia with LS changes   and vagina normal rugatted not atrophic  Examination of urethra  normal no masses, tenderness, scarring  bladder, no masses or tenderness  Cervix no lesions or discharge  Bimanual exam with   Uterus 6-7 weeks size, mid position, mobile,non-tenderness, no descent   Adnexa/parametria  Normal no mass     ASSESSMENT:  Cammy Mahmood is an 72 year old No obstetric history on file. postmenopausal woman   who presents for annual gyn exam.   Concerns:  None today   PLAN:  Dx:  1)  Pap smear not indicated, mammogram normal, Colonoscopy done last year through [facility]   2)  Lipids at appropriate intervals  3)  Covid-19 concerns: covid infection and vaccination recommendations discussed.   4)  Menopause:  Vagifem   5) lichen " sclerosis:  Restart diprolene, continue aquafor      PE:  Reviewed health maintenance including diet, regular exercise,   estrogen replacement and periodic exams.    Dr. Anu Tinajero, DO    Obstetrics and Gynecology  [facility] - [place] and [place]

## 2023-12-02 ENCOUNTER — HOSPITAL ENCOUNTER (EMERGENCY)
Facility: CLINIC | Age: 73
Discharge: HOME | End: 2023-12-02
Attending: EMERGENCY MEDICINE | Admitting: EMERGENCY MEDICINE
Payer: COMMERCIAL

## 2023-12-02 ENCOUNTER — APPOINTMENT (OUTPATIENT)
Dept: ULTRASOUND IMAGING | Facility: CLINIC | Age: 73
End: 2023-12-02
Attending: EMERGENCY MEDICINE
Payer: COMMERCIAL

## 2023-12-02 VITALS
TEMPERATURE: 97 F | BODY MASS INDEX: 33.49 KG/M2 | OXYGEN SATURATION: 96 % | WEIGHT: 182 LBS | RESPIRATION RATE: 18 BRPM | HEIGHT: 62 IN | DIASTOLIC BLOOD PRESSURE: 85 MMHG | SYSTOLIC BLOOD PRESSURE: 142 MMHG | HEART RATE: 68 BPM

## 2023-12-02 DIAGNOSIS — K80.20 SYMPTOMATIC CHOLELITHIASIS: ICD-10-CM

## 2023-12-02 DIAGNOSIS — R55 VASOVAGAL SYNCOPE: ICD-10-CM

## 2023-12-02 LAB
ALBUMIN SERPL BCG-MCNC: 4.3 G/DL (ref 3.5–5.2)
ALP SERPL-CCNC: 93 U/L (ref 40–150)
ALT SERPL W P-5'-P-CCNC: 30 U/L (ref 0–50)
ANION GAP SERPL CALCULATED.3IONS-SCNC: 11 MMOL/L (ref 7–15)
AST SERPL W P-5'-P-CCNC: 21 U/L (ref 0–45)
ATRIAL RATE - MUSE: 61 BPM
BASOPHILS # BLD AUTO: 0 10E3/UL (ref 0–0.2)
BASOPHILS NFR BLD AUTO: 0 %
BILIRUB SERPL-MCNC: 0.3 MG/DL
BUN SERPL-MCNC: 18.9 MG/DL (ref 8–23)
CALCIUM SERPL-MCNC: 9.6 MG/DL (ref 8.8–10.2)
CHLORIDE SERPL-SCNC: 105 MMOL/L (ref 98–107)
CREAT SERPL-MCNC: 0.85 MG/DL (ref 0.51–0.95)
DEPRECATED HCO3 PLAS-SCNC: 24 MMOL/L (ref 22–29)
DIASTOLIC BLOOD PRESSURE - MUSE: NORMAL MMHG
EGFRCR SERPLBLD CKD-EPI 2021: 73 ML/MIN/1.73M2
EOSINOPHIL # BLD AUTO: 0.2 10E3/UL (ref 0–0.7)
EOSINOPHIL NFR BLD AUTO: 2 %
ERYTHROCYTE [DISTWIDTH] IN BLOOD BY AUTOMATED COUNT: 12.6 % (ref 10–15)
GLUCOSE SERPL-MCNC: 162 MG/DL (ref 70–99)
HCT VFR BLD AUTO: 40 % (ref 35–47)
HGB BLD-MCNC: 13.1 G/DL (ref 11.7–15.7)
HOLD SPECIMEN: NORMAL
IMM GRANULOCYTES # BLD: 0 10E3/UL
IMM GRANULOCYTES NFR BLD: 0 %
INTERPRETATION ECG - MUSE: NORMAL
LIPASE SERPL-CCNC: 25 U/L (ref 13–60)
LYMPHOCYTES # BLD AUTO: 2.1 10E3/UL (ref 0.8–5.3)
LYMPHOCYTES NFR BLD AUTO: 22 %
MCH RBC QN AUTO: 29.7 PG (ref 26.5–33)
MCHC RBC AUTO-ENTMCNC: 32.8 G/DL (ref 31.5–36.5)
MCV RBC AUTO: 91 FL (ref 78–100)
MONOCYTES # BLD AUTO: 0.6 10E3/UL (ref 0–1.3)
MONOCYTES NFR BLD AUTO: 6 %
NEUTROPHILS # BLD AUTO: 6.4 10E3/UL (ref 1.6–8.3)
NEUTROPHILS NFR BLD AUTO: 70 %
NRBC # BLD AUTO: 0 10E3/UL
NRBC BLD AUTO-RTO: 0 /100
P AXIS - MUSE: 58 DEGREES
PLATELET # BLD AUTO: 225 10E3/UL (ref 150–450)
POTASSIUM SERPL-SCNC: 3.7 MMOL/L (ref 3.4–5.3)
PR INTERVAL - MUSE: 198 MS
PROT SERPL-MCNC: 7.4 G/DL (ref 6.4–8.3)
QRS DURATION - MUSE: 98 MS
QT - MUSE: 426 MS
QTC - MUSE: 428 MS
R AXIS - MUSE: -31 DEGREES
RBC # BLD AUTO: 4.41 10E6/UL (ref 3.8–5.2)
SODIUM SERPL-SCNC: 140 MMOL/L (ref 135–145)
SYSTOLIC BLOOD PRESSURE - MUSE: NORMAL MMHG
T AXIS - MUSE: -22 DEGREES
TROPONIN T SERPL HS-MCNC: <6 NG/L
TROPONIN T SERPL HS-MCNC: <6 NG/L
VENTRICULAR RATE- MUSE: 61 BPM
WBC # BLD AUTO: 9.3 10E3/UL (ref 4–11)

## 2023-12-02 PROCEDURE — 96361 HYDRATE IV INFUSION ADD-ON: CPT

## 2023-12-02 PROCEDURE — 99285 EMERGENCY DEPT VISIT HI MDM: CPT | Mod: 25

## 2023-12-02 PROCEDURE — 83690 ASSAY OF LIPASE: CPT | Performed by: EMERGENCY MEDICINE

## 2023-12-02 PROCEDURE — 250N000011 HC RX IP 250 OP 636: Mod: JZ | Performed by: EMERGENCY MEDICINE

## 2023-12-02 PROCEDURE — 93005 ELECTROCARDIOGRAM TRACING: CPT

## 2023-12-02 PROCEDURE — 85014 HEMATOCRIT: CPT | Performed by: EMERGENCY MEDICINE

## 2023-12-02 PROCEDURE — 80053 COMPREHEN METABOLIC PANEL: CPT | Performed by: EMERGENCY MEDICINE

## 2023-12-02 PROCEDURE — 96374 THER/PROPH/DIAG INJ IV PUSH: CPT

## 2023-12-02 PROCEDURE — 258N000003 HC RX IP 258 OP 636: Performed by: EMERGENCY MEDICINE

## 2023-12-02 PROCEDURE — 36415 COLL VENOUS BLD VENIPUNCTURE: CPT | Performed by: EMERGENCY MEDICINE

## 2023-12-02 PROCEDURE — 76705 ECHO EXAM OF ABDOMEN: CPT

## 2023-12-02 PROCEDURE — 84484 ASSAY OF TROPONIN QUANT: CPT | Performed by: EMERGENCY MEDICINE

## 2023-12-02 RX ORDER — ONDANSETRON 2 MG/ML
4 INJECTION INTRAMUSCULAR; INTRAVENOUS EVERY 30 MIN PRN
Status: DISCONTINUED | OUTPATIENT
Start: 2023-12-02 | End: 2023-12-02 | Stop reason: HOSPADM

## 2023-12-02 RX ADMIN — ONDANSETRON 4 MG: 2 INJECTION INTRAMUSCULAR; INTRAVENOUS at 18:57

## 2023-12-02 RX ADMIN — SODIUM CHLORIDE 1000 ML: 9 INJECTION, SOLUTION INTRAVENOUS at 18:57

## 2023-12-02 ASSESSMENT — ACTIVITIES OF DAILY LIVING (ADL)
ADLS_ACUITY_SCORE: 35
ADLS_ACUITY_SCORE: 35

## 2023-12-03 NOTE — ED TRIAGE NOTES
Triage Assessment (Adult)       Row Name 12/02/23 1823          Triage Assessment    Airway WDL WDL        Respiratory WDL    Respiratory WDL WDL        Skin Circulation/Temperature WDL    Skin Circulation/Temperature WDL WDL        Cardiac WDL    Cardiac WDL X        Peripheral/Neurovascular WDL    Peripheral Neurovascular WDL WDL        Cognitive/Neuro/Behavioral WDL    Cognitive/Neuro/Behavioral WDL WDL

## 2023-12-03 NOTE — ED NOTES
"Pt able to self-extricate from bed without assistance. Pt walked length of the hallway with steady, even strides. Pt states, \"I only feel a little lightheaded, I think more so from laying in the bed for so long.\" Pt able to get back into bed without assistance and feels safe returning home.  "

## 2023-12-03 NOTE — ED PROVIDER NOTES
History     Chief Complaint:  Nausea, Vomiting, & Diarrhea and Syncope     HPI   Cammy Mahmood is a 73 year old female with history of hypertension, hypercholesterolemia, and prediabetes who presents with family for evaluation of nausea, vomiting, diarrhea, and syncope. The patient reports that she was celebrating a birthday and ate pizza for dinner and had one glass of wine. States that she began to feel lightheaded and diaphoretic so she was going to go lay down. The patient's family reports that the patient's wine glass tipped over and the patient was slumped over in her chair and becoming cyanotic as if she was choking on something. States they moved her to the ground and were unable to find a pulse. Just before they were going to begin CPR, the patient woke back up and was pale for 10 more minutes. States that since then, the patient has had diarrhea and vomited. Upon arrival, the patient is still feeling lightheaded and nauseous. Endorses history of syncope three times due to pain and following childbirth. Denies cardiac history in her parents or siblings, but reports her daughter had an episode of SVT and had a pacemaker placed. Denies abdominal pain.    Independent Historian:   Daughter - They report as noted above.    Review of External Notes:   Chart review     Medications:    Estradiol tablet  Aspirin 81 MG  Doxylamine  Losartan  Omeprazole  Ropinirole    Past Medical History:    Lipoma of torso  Prediabetes  Hypercholesterolemia  Restless leg syndrome  Hypertension  Depression  Carpal tunnel syndrome     Past Surgical History:    Carpal tunnel release  Revision of upper eyelid      Physical Exam   Patient Vitals for the past 24 hrs:   BP Temp Temp src Pulse Resp SpO2 Height Weight   12/02/23 2058 (!) 142/85 -- -- 68 18 96 % -- --   12/02/23 1958 123/75 -- -- 69 11 100 % -- --   12/02/23 1834 137/85 -- -- 67 24 -- -- --   12/02/23 1831 (!) 147/90 -- -- 69 -- -- -- --   12/02/23 1822 133/75 97  F (36.1  " C) Temporal 64 18 97 % 1.575 m (5' 2\") 82.6 kg (182 lb)      Physical Exam  GENERAL: well developed, pleasant  HEAD: atraumatic  EYES: pupils reactive, extraocular muscles intact, conjunctivae normal  ENT:  mucus membranes moist  NECK:  trachea midline, normal range of motion  RESPIRATORY: no tachypnea, breath sounds clear to auscultation   CVS: normal S1/S2, no murmurs, intact distal pulses  ABDOMEN: soft, nontender, nondistention  MUSCULOSKELETAL: no deformities  SKIN: warm and dry, no acute rashes or ulceration  NEURO: GCS 15, cranial nerves intact, alert and oriented x3  PSYCH:  Mood/affect normal    Emergency Department Course   ECG  ECG taken at 1823, ECG read at 1835  Normal sinus rhythm  Left axis deviation  Possible anterior infarct, age undetermined   Rate 61 bpm. ME interval 198 ms. QRS duration 98 ms. QT/QTc 426/428 ms. P-R-T axes 58 -31 -32.     Imaging:  US Abdomen Limited (RUQ)   Final Result   IMPRESSION:   1.  Gallstone. No evidence for acute cholecystitis.   2.  Dilated common bile duct, without evidence for intrahepatic bile dilatation, possibly age related.               Laboratory:  Labs Ordered and Resulted from Time of ED Arrival to Time of ED Departure   COMPREHENSIVE METABOLIC PANEL - Abnormal       Result Value    Sodium 140      Potassium 3.7      Carbon Dioxide (CO2) 24      Anion Gap 11      Urea Nitrogen 18.9      Creatinine 0.85      GFR Estimate 73      Calcium 9.6      Chloride 105      Glucose 162 (*)     Alkaline Phosphatase 93      AST 21      ALT 30      Protein Total 7.4      Albumin 4.3      Bilirubin Total 0.3     LIPASE - Normal    Lipase 25     TROPONIN T, HIGH SENSITIVITY - Normal    Troponin T, High Sensitivity <6     TROPONIN T, HIGH SENSITIVITY - Normal    Troponin T, High Sensitivity <6     CBC WITH PLATELETS AND DIFFERENTIAL    WBC Count 9.3      RBC Count 4.41      Hemoglobin 13.1      Hematocrit 40.0      MCV 91      MCH 29.7      MCHC 32.8      RDW 12.6      " Platelet Count 225      % Neutrophils 70      % Lymphocytes 22      % Monocytes 6      % Eosinophils 2      % Basophils 0      % Immature Granulocytes 0      NRBCs per 100 WBC 0      Absolute Neutrophils 6.4      Absolute Lymphocytes 2.1      Absolute Monocytes 0.6      Absolute Eosinophils 0.2      Absolute Basophils 0.0      Absolute Immature Granulocytes 0.0      Absolute NRBCs 0.0        Emergency Department Course & Assessments:     Interventions:  Medications   ondansetron (ZOFRAN) injection 4 mg (4 mg Intravenous $Given 12/2/23 1857)   sodium chloride 0.9% BOLUS 1,000 mL (0 mLs Intravenous Stopped 12/2/23 2052)      Assessments:  1835 I obtained history and examined the patient as noted above.   2134 I rechecked and updated the patient.     Independent Interpretation (X-rays, CTs, rhythm strip):  None    Consultations/Discussion of Management or Tests:  None        Social Determinants of Health affecting care:   None    Disposition:  The patient was discharged to home.     Impression & Plan    Medical Decision Making:    Patient presents with syncope.  She notes eating pizza having 1 drink and talking with her family when she started having prodrome of lightheadedness warmth and nausea.  She then had syncopal episode without injury.  She notes that she has had syncopal episodes a few times in the past related to childbirth or other pain related issues.  She had vomiting and diarrhea afterwards.  Patient locates or looks to locate some pain in the right upper quadrant although when I palpate her abdomen is nontender.  Review of her chart looks like she has had cholelithiasis on prior CT.  EKG and troponin x2 are normal she was given fluids and feels much improved and back to normal.  Ultrasound showed cholelithiasis without evidence of cholecystitis.  Patient feels back to normal and feels comfortable going home.  Looks to be vasovagal as episode possibly related to the pizza and alcohol.      Diagnosis:     ICD-10-CM    1. Vasovagal syncope  R55       2. Symptomatic cholelithiasis  K80.20            Scribe Disclosure:  I, Citlaly Melchor, am serving as a scribe at 9:32 PM on 12/2/2023 to document services personally performed by Дмитрий Pena MD based on my observations and the provider's statements to me.     12/2/2023   Дмитрий Pena MD Adams, Shaun L, MD  12/02/23 3814

## 2023-12-03 NOTE — ED TRIAGE NOTES
Patient was eating pizza at hotel with family, at table suddenly slumped over and spilled her drink, family members who are in medical field thought she was chocking, then noted patient was pulse less for one minute, patient gained consciousness before CPR started, en route patient started to have nausea vomiting then had diarrhea upon ED arrival.

## 2023-12-15 ENCOUNTER — MEDICAL CORRESPONDENCE (OUTPATIENT)
Dept: HEALTH INFORMATION MANAGEMENT | Facility: CLINIC | Age: 73
End: 2023-12-15
Payer: COMMERCIAL

## 2024-02-21 ENCOUNTER — HOSPITAL ENCOUNTER (OUTPATIENT)
Dept: MAMMOGRAPHY | Facility: CLINIC | Age: 74
Discharge: HOME | End: 2024-02-21
Attending: INTERNAL MEDICINE | Admitting: INTERNAL MEDICINE
Payer: COMMERCIAL

## 2024-02-21 DIAGNOSIS — Z12.31 VISIT FOR SCREENING MAMMOGRAM: ICD-10-CM

## 2024-02-21 PROCEDURE — 77067 SCR MAMMO BI INCL CAD: CPT

## 2024-03-19 DIAGNOSIS — N95.2 VAGINAL ATROPHY: ICD-10-CM

## 2024-03-19 RX ORDER — ESTRADIOL 10 UG/1
10 INSERT VAGINAL
Qty: 24 TABLET | Refills: 0 | Status: SHIPPED | OUTPATIENT
Start: 2024-03-21 | End: 2024-04-15

## 2024-04-14 DIAGNOSIS — N95.2 VAGINAL ATROPHY: ICD-10-CM

## 2024-04-15 RX ORDER — ESTRADIOL 10 UG/1
10 INSERT VAGINAL
Qty: 24 TABLET | Refills: 0 | Status: SHIPPED | OUTPATIENT
Start: 2024-04-15 | End: 2024-08-12

## 2024-04-15 NOTE — TELEPHONE ENCOUNTER
Requested Prescriptions   Pending Prescriptions Disp Refills    estradiol (VAGIFEM) 10 MCG TABS vaginal tablet [Pharmacy Med Name: Estradiol Vaginal Tablet 10 MCG] 24 tablet 0     Sig: Place 1 tablet (10 mcg) vaginally twice a week       Hormone Replacement Therapy Failed - 4/14/2024  1:10 PM        Failed - Blood pressure under 140/90 in past 12 months     BP Readings from Last 3 Encounters:   12/02/23 (!) 142/85   05/04/23 118/70   05/25/22 128/70       No data recorded            Passed - Medication is active on med list        Passed - Recent (12 mo) or future (90 days) visit within the authorizing provider's specialty     The patient must have completed an in-person or virtual visit within the past 12 months or has a future visit scheduled within the next 90 days with the authorizing provider s specialty.  Urgent care and e-visits do not quality as an office visit for this protocol.          Passed - Medication indicated for associated diagnosis     The medication is prescribed for one or more of the following conditions:    Menopause   Vulva/Vaginal atrophy   Low Estrogen   Gender Dysphoria   Male to Female transgender          Passed - Patient is 18 years of age or older        Passed - No active pregnancy on record        Passed - No positive pregnancy test on record in past 12 months       Hormone Replacement Therapy (vaginal) Passed - 4/14/2024  1:10 PM        Passed - Medication is active on med list        Passed - Recent (12 mo) or future (90 days) visit within the authorizing provider's specialty     The patient must have completed an in-person or virtual visit within the past 12 months or has a future visit scheduled within the next 90 days with the authorizing provider s specialty.  Urgent care and e-visits do not quality as an office visit for this protocol.          Passed - Medication indicated for associated diagnosis     Medication is associated with one or more of the following diagnoses:      Menopause   Vulva/Vaginal atrophy   UTI prophylaxis          Passed - Patient is 18 years of age or older        Passed - No active pregnancy on record        Passed - No positive pregnancy test on record in past 12 months           Has upcoming appt, filled.

## 2024-04-23 ENCOUNTER — HOSPITAL ENCOUNTER (OUTPATIENT)
Dept: RADIOLOGY | Facility: HOSPITAL | Age: 74
Discharge: HOME | End: 2024-04-23
Payer: MEDICARE

## 2024-04-23 ENCOUNTER — OFFICE VISIT (OUTPATIENT)
Dept: ORTHOPEDICS | Facility: CLINIC | Age: 74
End: 2024-04-23
Payer: MEDICARE

## 2024-04-23 DIAGNOSIS — S89.92XA KNEE INJURY, LEFT, INITIAL ENCOUNTER: Primary | ICD-10-CM

## 2024-04-23 DIAGNOSIS — S89.92XA INJURY OF LEFT KNEE, INITIAL ENCOUNTER: ICD-10-CM

## 2024-04-23 DIAGNOSIS — S89.92XA INJURY OF LEFT KNEE, INITIAL ENCOUNTER: Primary | ICD-10-CM

## 2024-04-23 PROCEDURE — 73562 X-RAY EXAM OF KNEE 3: CPT | Mod: 26,LT,, | Performed by: STUDENT IN AN ORGANIZED HEALTH CARE EDUCATION/TRAINING PROGRAM

## 2024-04-23 PROCEDURE — 99202 OFFICE O/P NEW SF 15 MIN: CPT | Mod: PBBFAC,25

## 2024-04-23 PROCEDURE — 73562 X-RAY EXAM OF KNEE 3: CPT | Mod: TC,LT

## 2024-04-23 PROCEDURE — 99203 OFFICE O/P NEW LOW 30 MIN: CPT | Mod: S$PBB,,,

## 2024-04-23 NOTE — PROGRESS NOTES
CC: No chief complaint on file.         Mari Godwin is a 73 y.o. female seen today for No chief complaint on file.  Patient presents today complaining of continued left knee pain and swelling after injury.  Patient states a proximally 2 weeks ago she was stepping down some steps at a friend's house.  She states that she realized when going down that this step was higher than she thought.  She felt her knee give out and buckle at the time of the injury.  She did not fall.  Patient has used ice, elevation, and topical slush oral NSAIDs for pain relief.  Patient is concerned as she is continued to have knee pain and swelling since the injury despite taking medications.  She states that the pain is mostly located on the inside of her knee, it does not radiate.  No other complaints today.        PAST MEDICAL HISTORY: No past medical history on file.       PAST SURGICAL HISTORY: No past surgical history on file.       ALLERGIES: Review of patient's allergies indicates:  Not on File     MEDICATIONS :  No current outpatient medications on file.     SOCIAL HISTORY:   Social History     Socioeconomic History    Marital status:         FAMILY HISTORY: No family history on file.       PHYSICAL EXAM:      There were no vitals filed for this visit.  There is no height or weight on file to calculate BMI.    GENERAL: Well-developed, well-nourished female . The patient is alert, oriented and cooperative.    HEENT:  Normocephalic, atraumatic.  Extraocular movements are intact bilaterally.     NECK:  Nontender with good range of motion.    LUNGS:  Clear to auscultation bilaterally.    HEART:  Regular rate and rhythm.     ABDOMEN:  Soft, non-tender, non-distended.      EXTREMITIES:  Left knee with skin clean dry and intact, moderate soft tissue swelling medially, no joint effusion appreciated on exam, tenderness along medial joint line, range of motion from 0-100 degrees but not without pain, knee is stable  to varus and valgus stress testing, positive Calos's, negative anterior drawer testing, neurovascularly intact      RADIOGRAPHIC FINDINGS:   X-Ray Knee AP LAT with Sunrise Left    Result Date: 4/23/2024  EXAMINATION: XR KNEE AP LAT WITH SUNRISE LEFT CLINICAL HISTORY: Unspecified injury of left lower leg, initial encounter TECHNIQUE: XR KNEE AP LAT WITH SUNRISE LEFT COMPARISON: None FINDINGS: No acute fracture or dislocation. Mild tricompartmental degenerative change No radiopaque foreign bodies.     No acute findings Mild tricompartmental degenerative change Electronically signed by: Geronimo Moya Date:    04/23/2024 Time:    13:53       There are no problems to display for this patient.    IMPRESSION AND PLAN:  Acute left knee pain after injury.  Personally reviewed x-rays today with mild-to-moderate tricompartmental DJD changes, no acute fracture or dislocation.  Discussed all treatment options with the patient today.  Continue RICE therapy and topical/oral NSAIDs for pain.  Recommend MRI for further evaluation of possible occult fracture or meniscus injury based on physical exam findings.  Discussed with the patient that we will call her with MRI results and follow up with Dr. Hilario.    No follow-ups on file.       Althea Anthony PA-C      (Subject to voice recognition error, transcription service not allowed)

## 2024-04-24 ENCOUNTER — OFFICE VISIT (OUTPATIENT)
Dept: OBGYN | Facility: CLINIC | Age: 74
End: 2024-04-24
Payer: COMMERCIAL

## 2024-04-24 VITALS
SYSTOLIC BLOOD PRESSURE: 130 MMHG | WEIGHT: 190.9 LBS | BODY MASS INDEX: 35.13 KG/M2 | DIASTOLIC BLOOD PRESSURE: 80 MMHG | HEIGHT: 62 IN

## 2024-04-24 DIAGNOSIS — L90.0 LICHEN SCLEROSUS: Primary | ICD-10-CM

## 2024-04-24 DIAGNOSIS — Z01.419 ENCOUNTER FOR GYNECOLOGICAL EXAMINATION (GENERAL) (ROUTINE) WITHOUT ABNORMAL FINDINGS: ICD-10-CM

## 2024-04-24 PROCEDURE — 99207 PR INITIAL PREVENTIVE EXAM STAT: CPT | Performed by: FAMILY MEDICINE

## 2024-04-24 PROCEDURE — 99459 PELVIC EXAMINATION: CPT | Performed by: FAMILY MEDICINE

## 2024-04-24 PROCEDURE — 99213 OFFICE O/P EST LOW 20 MIN: CPT | Performed by: FAMILY MEDICINE

## 2024-04-24 RX ORDER — ATORVASTATIN CALCIUM 20 MG/1
TABLET, FILM COATED ORAL
COMMUNITY
Start: 2023-03-14

## 2024-04-24 RX ORDER — ROPINIROLE 4 MG/1
TABLET, FILM COATED ORAL
COMMUNITY
Start: 2023-12-31

## 2024-04-24 NOTE — PROGRESS NOTES
SUBJECTIVE:  Cammy Mahmood is an 73 year old  postmenopausal woman   who presents for annual gyn exam. Menopause at age 60s. No   bleeding, spotting, or discharge noted. Concerns:  none    Estrogen replacement therapy: vag fem   CHEO exposure: no  History of abnormal Pap smear: Yes: but recently normal,   Now off pap screening.   Family history of uterine or ovarian cancer: No  History of abnormal mammogram: No  Family history of breast cancer: Yes:  age 50.      History reviewed. No pertinent past medical history.       History reviewed. No pertinent family history.    History reviewed. No pertinent surgical history.    Current Outpatient Medications   Medication Sig Dispense Refill    aspirin (ASA) 81 MG chewable tablet Take 81 mg by mouth daily      atorvastatin (LIPITOR) 20 MG tablet       augmented betamethasone dipropionate (DIPROLENE AF) 0.05 % external cream Apply topically 2 times daily Please dispense 70 gm tube 70 g 11    coenzyme Q-10 10 MG CAPS       doxylamine (UNISOM) 25 MG TABS tablet Take 25 mg by mouth At Bedtime      estradiol (VAGIFEM) 10 MCG TABS vaginal tablet Place 1 tablet (10 mcg) vaginally twice a week 24 tablet 0    losartan (COZAAR) 50 MG tablet       metoprolol succinate ER (TOPROL-XL) 50 MG 24 hr tablet       Multiple Vitamin (MULTIVITAMIN OR) Take  by mouth.      omeprazole (PRILOSEC) 20 MG DR capsule Take 1 capsule by mouth      rOPINIRole (REQUIP) 4 MG tablet       vitamin D3 (CHOLECALCIFEROL) 125 MCG (5000 UT) tablet Take 5,000 Units by mouth      Vitamins-Lipotropics (B COMPLEX FORMULA 1, LIPOTROP,) TABS Take 1 tablet by mouth       No current facility-administered medications for this visit.     Allergies   Allergen Reactions    Other [No Clinical Screening - See Comments]      mirapex for restless legs    Dust Mites Other (See Comments)     Cat dander, dog dander    Other Environmental Allergy Other (See Comments)     Other reaction(s): Runny Nose  Cat dander, dog  "jimenader  Mirapex - Bloody mucous drainage in urine       Social History     Tobacco Use    Smoking status: Never    Smokeless tobacco: Never   Substance Use Topics    Alcohol use: Not on file       Review Of Systems  Ears/Nose/Throat: negative  Respiratory: No shortness of breath, dyspnea on exertion, cough, or hemoptysis  Cardiovascular: negative  Gastrointestinal: negative  Genitourinary: See HPI   Constitutional, HEENT, cardiovascular, pulmonary, GI, , musculoskeletal, neuro, skin, endocrine and psych systems are negative, except as otherwise noted.    OBJECTIVE:  /80   Ht 1.575 m (5' 2\")   Wt 86.6 kg (190 lb 14.4 oz)   BMI 34.92 kg/m    General appearance: healthy, alert and no distress  Skin: Skin color, texture, turgor normal. No rashes or lesions.  Ears: negative  Nose/Sinuses: Nares normal. Septum midline. Mucosa normal. No drainage or sinus tenderness.  Oropharynx: Lips, mucosa, and tongue normal. Teeth and gums normal.  Neck: Neck supple. No adenopathy. Thyroid symmetric, normal size,, Carotids without bruits.  Lungs: negative, Percussion normal. Good diaphragmatic excursion. Lungs clear  Heart: negative, PMI normal. No lifts, heaves, or thrills. RRR. No murmurs, clicks gallops or rub  Breasts: Inspection negative. No nipple discharge or bleeding. No masses.  Abdomen: Abdomen soft, non-tender. BS normal. No masses, organomegaly  Pelvic: Pelvic:  Pelvic examination with no pap/no Gonorrhea and Chlamydia   including  External genitalia with clitoral whitening and atrophic changes    and vagina normal rugatted not atrophic  Examination of urethra  normal no masses, tenderness, scarring  bladder, no masses or tenderness  Cervix no lesions or discharge  Bimanual exam with   Uterus  weeks size, mid position, mobile,no-tenderness, normal no descent   Adnexa/parametria  normal no masses     ASSESSMENT:  Cammy Mahmood is an 73 year old  postmenopausal woman   who presents for annual gyn exam. "   Concerns:    PLAN:  Dx:  1)  Pap smear not indicated, mammogram completed, Colonoscopy completed   2)  Lipids at appropriate intervals  3)  Covid-19 concerns: covid infection and vaccination recommendations discussed.   4)  Menopause:  vaginal estrogen   5) Lichen sclerosis:  clitoral irritation with whitening, apply clobetasol daily for 3-4 weeks   Return for recheck       PE:  Reviewed health maintenance including diet, regular exercise,   estrogen replacement and periodic exams.    Dr. Anu Tinajero, DO    Obstetrics and Gynecology  [facility] - [place] and [place]

## 2024-04-24 NOTE — NURSING NOTE
"Chief Complaint   Patient presents with    Gyn Exam       Initial /80   Ht 1.575 m (5' 2\")   Wt 86.6 kg (190 lb 14.4 oz)   BMI 34.92 kg/m   Estimated body mass index is 34.92 kg/m  as calculated from the following:    Height as of this encounter: 1.575 m (5' 2\").    Weight as of this encounter: 86.6 kg (190 lb 14.4 oz).  BP completed using cuff size: regular    Questioned patient about current smoking habits.  Pt. has never smoked.          The following HM Due: NONE    "

## 2024-04-24 NOTE — PATIENT INSTRUCTIONS
Return in 3-4 weeks for recheck     Dr. Anu Tinajero,     Obstetrics and Gynecology  [facility] - [place] and [place]

## 2024-04-30 ENCOUNTER — TELEPHONE (OUTPATIENT)
Dept: ORTHOPEDICS | Facility: CLINIC | Age: 74
End: 2024-04-30
Payer: MEDICARE

## 2024-04-30 DIAGNOSIS — M25.562 ACUTE PAIN OF LEFT KNEE: Primary | ICD-10-CM

## 2024-04-30 NOTE — TELEPHONE ENCOUNTER
----- Message from ALIE Borjas sent at 4/30/2024  8:28 AM CDT -----  Please call and schedule this patient for an open MRI.  ----- Message -----  From: Yesenia Cantor  Sent: 4/26/2024  10:17 AM CDT  To: Gabrielle Ferro Staff     Patient is schedule for and MRI on May 13 would like for you to cancel it would like to be schedule for the open air MRI CALL BACK NUMBER [phone]

## 2024-06-18 DIAGNOSIS — N90.89 VULVAR LESION: ICD-10-CM

## 2024-06-18 DIAGNOSIS — L90.0 LICHEN SCLEROSUS: ICD-10-CM

## 2024-06-18 NOTE — TELEPHONE ENCOUNTER
Last Written Prescription Date:  5/4/23  Last Fill Quantity: 70g,  # refills: 11   Last office visit: 4/24/2024 ;  with prescribing provider:  Tanvi   Future Office Visit:  8/5/2024

## 2024-06-19 RX ORDER — BETAMETHASONE DIPROPIONATE 0.5 MG/G
CREAM TOPICAL 2 TIMES DAILY
Qty: 70 G | Refills: 11 | Status: SHIPPED | OUTPATIENT
Start: 2024-06-19

## 2024-08-05 ENCOUNTER — OFFICE VISIT (OUTPATIENT)
Dept: OBGYN | Facility: CLINIC | Age: 74
End: 2024-08-05
Payer: COMMERCIAL

## 2024-08-05 VITALS
BODY MASS INDEX: 34.21 KG/M2 | DIASTOLIC BLOOD PRESSURE: 90 MMHG | WEIGHT: 185.9 LBS | HEIGHT: 62 IN | SYSTOLIC BLOOD PRESSURE: 148 MMHG

## 2024-08-05 DIAGNOSIS — L90.0 LICHEN SCLEROSUS: Primary | ICD-10-CM

## 2024-08-05 PROCEDURE — 99214 OFFICE O/P EST MOD 30 MIN: CPT | Performed by: FAMILY MEDICINE

## 2024-08-05 RX ORDER — BETAMETHASONE DIPROPIONATE 0.5 MG/G
OINTMENT, AUGMENTED TOPICAL 2 TIMES DAILY
Qty: 70 G | Refills: 3 | Status: SHIPPED | OUTPATIENT
Start: 2024-08-05

## 2024-08-05 NOTE — NURSING NOTE
"Chief Complaint   Patient presents with    RECHECK     Follow up lichen sclerosis       Initial BP (!) 148/90   Ht 1.575 m (5' 2\")   Wt 84.3 kg (185 lb 14.4 oz)   BMI 34.00 kg/m   Estimated body mass index is 34 kg/m  as calculated from the following:    Height as of this encounter: 1.575 m (5' 2\").    Weight as of this encounter: 84.3 kg (185 lb 14.4 oz).  BP completed using cuff size: regular    Questioned patient about current smoking habits.  Pt. has never smoked.          The following HM Due: NONE    "

## 2024-08-05 NOTE — PATIENT INSTRUCTIONS
Return yearly     Dr. Anu Tinajero, DO    Obstetrics and Gynecology  [facility] - [place] and [place]

## 2024-08-05 NOTE — PROGRESS NOTES
SUBJECTIVE:  Cammy Mahmood is an 73 year old   woman who presents for   Gyn follow-up exam. She was previously seen for Lichen sclerosis.    History reviewed. No pertinent past medical history.       History reviewed. No pertinent family history.    History reviewed. No pertinent surgical history.    Current Outpatient Medications   Medication Sig Dispense Refill    aspirin (ASA) 81 MG chewable tablet Take 81 mg by mouth daily      atorvastatin (LIPITOR) 20 MG tablet       augmented betamethasone dipropionate (DIPROLENE AF) 0.05 % external cream Apply topically 2 times daily Please dispense 70 gm tube 70 g 11    coenzyme Q-10 10 MG CAPS       doxylamine (UNISOM) 25 MG TABS tablet Take 25 mg by mouth At Bedtime      estradiol (VAGIFEM) 10 MCG TABS vaginal tablet Place 1 tablet (10 mcg) vaginally twice a week 24 tablet 0    losartan (COZAAR) 50 MG tablet       metoprolol succinate ER (TOPROL-XL) 50 MG 24 hr tablet       Multiple Vitamin (MULTIVITAMIN OR) Take  by mouth.      omeprazole (PRILOSEC) 20 MG DR capsule Take 1 capsule by mouth      rOPINIRole (REQUIP) 4 MG tablet       vitamin D3 (CHOLECALCIFEROL) 125 MCG (5000 UT) tablet Take 5,000 Units by mouth      Vitamins-Lipotropics (B COMPLEX FORMULA 1, LIPOTROP,) TABS Take 1 tablet by mouth       No current facility-administered medications for this visit.     Allergies   Allergen Reactions    Other [No Clinical Screening - See Comments]      mirapex for restless legs    Dust Mites Other (See Comments)     Cat dander, dog dander    Other Environmental Allergy Other (See Comments)     Other reaction(s): Runny Nose  Cat dander, dog dander  Mirapex - Bloody mucous drainage in urine       Social History     Tobacco Use    Smoking status: Never    Smokeless tobacco: Never   Substance Use Topics    Alcohol use: Not on file       Review Of Systems  Ears/Nose/Throat: negative  Respiratory: No shortness of breath, dyspnea on exertion, cough, or  "hemoptysis  Cardiovascular: negative  Gastrointestinal: negative  Genitourinary: See HPI   Constitutional, HEENT, cardiovascular, pulmonary, GI, , musculoskeletal, neuro, skin, endocrine and psych systems are negative, except as otherwise noted.      OBJECTIVE:  BP (!) 148/90   Ht 1.575 m (5' 2\")   Wt 84.3 kg (185 lb 14.4 oz)   BMI 34.00 kg/m    General appearance: healthy, alert, and no distress  Skin: Skin color, texture, turgor normal. No rashes or lesions.  Ears: negative  Nose/Sinuses: Nares normal. Septum midline. Mucosa normal. No drainage or sinus tenderness.  Oropharynx: Lips, mucosa, and tongue normal. Teeth and gums normal.  Neck: Neck supple. No adenopathy. Thyroid symmetric, normal size,, Carotids without bruits.  Pelvic: Pelvic examination with no pap/no Gonorrhea and Chlamydia  External genitalia normal with LS changes  and vagina normal rugatted with some atrophic -      ASSESSMENT:  Cammy Mahmood is an 73 year old   woman who presents for   Gyn follow-up exam. She was previously seen for lichen sclerosis.  Today the following concerns were   addressed:    PLAN:  Dx: LS  1)  LS:  change to diprolene ointment         Labs were reviewed in [facility]   Imaging was reviewed in Epic   Tests and documents were reviewed.   Discussion of management or test interpretation     Dr. Anu Tinajero, DO    OB/GYN   [facility]     "

## 2024-08-11 DIAGNOSIS — N95.2 VAGINAL ATROPHY: ICD-10-CM

## 2024-08-12 RX ORDER — ESTRADIOL 10 UG/1
10 INSERT VAGINAL
Qty: 24 TABLET | Refills: 2 | Status: SHIPPED | OUTPATIENT
Start: 2024-08-12

## 2024-08-12 NOTE — TELEPHONE ENCOUNTER
Requested Prescriptions   Pending Prescriptions Disp Refills    estradiol (VAGIFEM) 10 MCG TABS vaginal tablet [Pharmacy Med Name: Estradiol Vaginal Tablet 10 MCG] 24 tablet 0     Sig: PLACE 1 TABLET (10 MCG) VAGINALLY TWICE A WEEK       Hormone Replacement Therapy Failed - 8/11/2024  6:19 AM        Failed - Blood pressure under 140/90 in past 12 months     BP Readings from Last 3 Encounters:   08/05/24 (!) 148/90   04/24/24 130/80   12/02/23 (!) 142/85       No data recorded            Passed - Medication is active on med list        Passed - Recent (12 mo) or future (90 days) visit within the authorizing provider's specialty     The patient must have completed an in-person or virtual visit within the past 12 months or has a future visit scheduled within the next 90 days with the authorizing provider s specialty.  Urgent care and e-visits do not quality as an office visit for this protocol.          Passed - Medication indicated for associated diagnosis     The medication is prescribed for one or more of the following conditions:    Menopause   Vulva/Vaginal atrophy   Low Estrogen   Gender Dysphoria   Male to Female transgender          Passed - Patient is 18 years of age or older        Passed - No active pregnancy on record        Passed - No positive pregnancy test on record in past 12 months       Hormone Replacement Therapy (vaginal) Passed - 8/11/2024  6:19 AM        Passed - Medication is active on med list        Passed - Recent (12 mo) or future (90 days) visit within the authorizing provider's specialty     The patient must have completed an in-person or virtual visit within the past 12 months or has a future visit scheduled within the next 90 days with the authorizing provider s specialty.  Urgent care and e-visits do not quality as an office visit for this protocol.          Passed - Medication indicated for associated diagnosis     Medication is associated with one or more of the following diagnoses:      Menopause   Vulva/Vaginal atrophy   UTI prophylaxis          Passed - Patient is 18 years of age or older        Passed - No active pregnancy on record        Passed - No positive pregnancy test on record in past 12 months           Filled.    Bebe SCHULTZ RN BSN  [facility]

## 2025-02-24 ENCOUNTER — HOSPITAL ENCOUNTER (OUTPATIENT)
Dept: MAMMOGRAPHY | Facility: CLINIC | Age: 75
Discharge: HOME | End: 2025-02-24
Attending: INTERNAL MEDICINE | Admitting: INTERNAL MEDICINE
Payer: COMMERCIAL

## 2025-02-24 DIAGNOSIS — Z12.31 SCREENING MAMMOGRAM, ENCOUNTER FOR: ICD-10-CM

## 2025-02-24 PROCEDURE — 77063 BREAST TOMOSYNTHESIS BI: CPT

## 2025-03-12 DIAGNOSIS — N95.2 VAGINAL ATROPHY: ICD-10-CM

## 2025-03-12 RX ORDER — ESTRADIOL 10 UG/1
10 TABLET, FILM COATED VAGINAL
Qty: 24 TABLET | Refills: 0 | Status: SHIPPED | OUTPATIENT
Start: 2025-03-13

## 2025-03-12 NOTE — TELEPHONE ENCOUNTER
Prescription approved per [facility] Refill Protocol.  Candace STANLEY RN  [facility]

## 2025-04-30 ENCOUNTER — OFFICE VISIT (OUTPATIENT)
Dept: OBGYN | Facility: CLINIC | Age: 75
End: 2025-04-30
Payer: COMMERCIAL

## 2025-04-30 VITALS
SYSTOLIC BLOOD PRESSURE: 134 MMHG | DIASTOLIC BLOOD PRESSURE: 78 MMHG | BODY MASS INDEX: 35.3 KG/M2 | HEIGHT: 62 IN | WEIGHT: 191.8 LBS

## 2025-04-30 DIAGNOSIS — L90.0 LICHEN SCLEROSUS: ICD-10-CM

## 2025-04-30 DIAGNOSIS — N90.89 VULVAR LESION: ICD-10-CM

## 2025-04-30 PROCEDURE — 3078F DIAST BP <80 MM HG: CPT | Performed by: FAMILY MEDICINE

## 2025-04-30 PROCEDURE — 99214 OFFICE O/P EST MOD 30 MIN: CPT | Performed by: FAMILY MEDICINE

## 2025-04-30 PROCEDURE — 3075F SYST BP GE 130 - 139MM HG: CPT | Performed by: FAMILY MEDICINE

## 2025-04-30 RX ORDER — BETAMETHASONE DIPROPIONATE 0.5 MG/G
CREAM TOPICAL 2 TIMES DAILY
Qty: 70 G | Refills: 11 | Status: CANCELLED | OUTPATIENT
Start: 2025-04-30

## 2025-04-30 RX ORDER — BETAMETHASONE DIPROPIONATE 0.5 MG/G
OINTMENT, AUGMENTED TOPICAL 2 TIMES DAILY
Qty: 70 G | Refills: 11 | Status: SHIPPED | OUTPATIENT
Start: 2025-04-30

## 2025-04-30 NOTE — PATIENT INSTRUCTIONS
Return yearly       Dr. Anu Tinajero, DO    Obstetrics and Gynecology  [facility] - [place] and [place]

## 2025-04-30 NOTE — NURSING NOTE
"Chief Complaint   Patient presents with    RECHECK     Follow up lichen sclerosus     initial /78   Ht 1.575 m (5' 2\")   Wt 87 kg (191 lb 12.8 oz)   BMI 35.08 kg/m   Estimated body mass index is 35.08 kg/m  as calculated from the following:    Height as of this encounter: 1.575 m (5' 2\").    Weight as of this encounter: 87 kg (191 lb 12.8 oz).  BP completed using cuff size regular    Lucrecia Arzola CMA on 4/30/2025 at 2:28 PM    "

## 2025-04-30 NOTE — PROGRESS NOTES
SUBJECTIVE:  Cammy Mahmood is an 73 year old   woman who presents for   Gyn follow-up exam. She was previously seen for Lichen sclerosis.    History reviewed. No pertinent past medical history.       History reviewed. No pertinent family history.    History reviewed. No pertinent surgical history.    Current Outpatient Medications   Medication Sig Dispense Refill    aspirin (ASA) 81 MG chewable tablet Take 81 mg by mouth daily      atorvastatin (LIPITOR) 20 MG tablet       augmented betamethasone dipropionate (DIPROLENE-AF) 0.05 % external ointment Apply topically 2 times daily 70 g 3    coenzyme Q-10 10 MG CAPS       doxylamine (UNISOM) 25 MG TABS tablet Take 25 mg by mouth At Bedtime      estradiol (VAGIFEM) 10 MCG TABS vaginal tablet PLACE 1 TABLET (10 MCG) VAGINALLY TWICE A WEEK 24 tablet 0    losartan (COZAAR) 50 MG tablet       metoprolol succinate ER (TOPROL-XL) 50 MG 24 hr tablet       Multiple Vitamin (MULTIVITAMIN OR) Take  by mouth.      omeprazole (PRILOSEC) 20 MG DR capsule Take 1 capsule by mouth      rOPINIRole (REQUIP) 4 MG tablet       vitamin D3 (CHOLECALCIFEROL) 125 MCG (5000 UT) tablet Take 5,000 Units by mouth      Vitamins-Lipotropics (B COMPLEX FORMULA 1, LIPOTROP,) TABS Take 1 tablet by mouth       No current facility-administered medications for this visit.     Allergies   Allergen Reactions    Other [No Clinical Screening - See Comments]      mirapex for restless legs    Dust Mites Other (See Comments)     Cat dander, dog dander    Other Environmental Allergy Other (See Comments)     Other reaction(s): Runny Nose  Cat dander, dog dander  Mirapex - Bloody mucous drainage in urine       Social History     Tobacco Use    Smoking status: Never    Smokeless tobacco: Never   Substance Use Topics    Alcohol use: Not on file       Review Of Systems  Ears/Nose/Throat: negative  Respiratory: No shortness of breath, dyspnea on exertion, cough, or hemoptysis  Cardiovascular:  "negative  Gastrointestinal: negative  Genitourinary: negative  Constitutional, HEENT, cardiovascular, pulmonary, GI, , musculoskeletal, neuro, skin, endocrine and psych systems are negative, except as otherwise noted.      OBJECTIVE:  /78   Ht 1.575 m (5' 2\")   Wt 87 kg (191 lb 12.8 oz)   BMI 35.08 kg/m    General appearance: healthy, alert, and no distress  Skin: Skin color, texture, turgor normal. No rashes or lesions.  Ears: negative  Nose/Sinuses: Nares normal. Septum midline. Mucosa normal. No drainage or sinus tenderness.  Oropharynx: Lips, mucosa, and tongue normal. Teeth and gums normal.  Neck: Neck supple. No adenopathy. Thyroid symmetric, normal size,, Carotids without bruits.  Lungs: negative, Percussion normal. Good diaphragmatic excursion. Lungs clear  Heart: negative, PMI normal. No lifts, heaves, or thrills. RRR. No murmurs, clicks gallops or rub  Pelvic: Pelvic examination with no pap/no Gonorrhea and Chlamydia  External genitalia normal   and vagina normal rugatted not atrophic -  Examination of urethra showed normal no masses, tenderness, scarring  bladder, no masses or tenderness  Cervix no lesions or discharge  Bimanual exam with   Uterus 6 weeks size, mid position, mobile, no tenderness, no descent   Adnexa/parametria normal      ASSESSMENT:  Cammy Mahmood is an 73 year old   woman who presents for   Gyn follow-up exam. She was previously seen for lichen sclerosis.  Today the following concerns were   addressed:     PLAN:  Dx: LS  1)  LS:  continue diprolene ointment, 2-3 times weekly   2)  normal pelvic exam        Dr. Anu Tinajero, DO    OB/GYN   [facility]     "

## 2025-05-28 DIAGNOSIS — N95.2 VAGINAL ATROPHY: ICD-10-CM

## 2025-05-28 RX ORDER — ESTRADIOL 10 UG/1
10 TABLET, FILM COATED VAGINAL
Qty: 24 TABLET | Refills: 0 | Status: SHIPPED | OUTPATIENT
Start: 2025-05-29

## 2025-05-28 NOTE — TELEPHONE ENCOUNTER
Last OV 4/30/25.   Pt was is also taking Diprolene ointment for lichen sclerosis.    Candace STANLEY RN  [facility]

## 2025-08-27 DIAGNOSIS — N95.2 VAGINAL ATROPHY: ICD-10-CM

## 2025-08-27 RX ORDER — ESTRADIOL 10 UG/1
10 TABLET, FILM COATED VAGINAL
Qty: 24 TABLET | Refills: 1 | Status: SHIPPED | OUTPATIENT
Start: 2025-08-28